# Patient Record
Sex: FEMALE | Race: WHITE | NOT HISPANIC OR LATINO | Employment: OTHER | URBAN - METROPOLITAN AREA
[De-identification: names, ages, dates, MRNs, and addresses within clinical notes are randomized per-mention and may not be internally consistent; named-entity substitution may affect disease eponyms.]

---

## 2017-02-01 ENCOUNTER — TELEPHONE (OUTPATIENT)
Dept: SMOKING CESSATION | Facility: CLINIC | Age: 82
End: 2017-02-01

## 2017-02-07 ENCOUNTER — TELEPHONE (OUTPATIENT)
Dept: SMOKING CESSATION | Facility: CLINIC | Age: 82
End: 2017-02-07

## 2017-03-06 ENCOUNTER — OFFICE VISIT (OUTPATIENT)
Dept: OPHTHALMOLOGY | Facility: CLINIC | Age: 82
End: 2017-03-06
Payer: MEDICARE

## 2017-03-06 DIAGNOSIS — Z98.42 CATARACT EXTRACTION STATUS, LEFT: ICD-10-CM

## 2017-03-06 DIAGNOSIS — E11.9 TYPE 2 DIABETES MELLITUS WITHOUT COMPLICATION, WITHOUT LONG-TERM CURRENT USE OF INSULIN: Primary | ICD-10-CM

## 2017-03-06 DIAGNOSIS — Z98.41 CATARACT EXTRACTION STATUS, RIGHT: ICD-10-CM

## 2017-03-06 PROCEDURE — 99999 PR PBB SHADOW E&M-EST. PATIENT-LVL I: CPT | Mod: PBBFAC,,, | Performed by: OPHTHALMOLOGY

## 2017-03-06 PROCEDURE — 92014 COMPRE OPH EXAM EST PT 1/>: CPT | Mod: S$PBB,,, | Performed by: OPHTHALMOLOGY

## 2017-03-06 PROCEDURE — 99211 OFF/OP EST MAY X REQ PHY/QHP: CPT | Mod: PBBFAC | Performed by: OPHTHALMOLOGY

## 2017-03-06 RX ORDER — IBUPROFEN 100 MG/5ML
1000 SUSPENSION, ORAL (FINAL DOSE FORM) ORAL DAILY
COMMUNITY

## 2017-03-06 NOTE — PROGRESS NOTES
HPI     Here for yearly check.  She has recently had problems with balance.  No   vision problems noticed by pt.    1. PCIOL OD 3/1/07  PCIOL OS 3/14/07  2. DM w/o BDR  3. YAG OS 02/12/16       Last edited by Aislinn Allred on 3/6/2017 10:20 AM.         Assessment /Plan     For exam results, see Encounter Report.      ICD-10-CM ICD-9-CM    1. Type 2 diabetes mellitus without complication, without long-term current use of insulin E11.9 250.00 Diabetes with no diabetic retinopathy on dilated exam.   Reviewed diabetic eye precautions including excellent blood sugar control, and importance of regular follow up.          2. Cataract extraction status, left Z98.42 V45.61 Well   3. Cataract extraction status, right Z98.41 V45.61 Well       RETURN TO CLINIC 1 year/ prn

## 2017-03-24 ENCOUNTER — TELEPHONE (OUTPATIENT)
Dept: SMOKING CESSATION | Facility: CLINIC | Age: 82
End: 2017-03-24

## 2017-03-24 NOTE — TELEPHONE ENCOUNTER
Pt is now able to renew benefits with [facility] to rejoin smoking cessation program. Left message with name and call back number to schedule appointment.

## 2017-03-28 ENCOUNTER — TELEPHONE (OUTPATIENT)
Dept: SMOKING CESSATION | Facility: CLINIC | Age: 82
End: 2017-03-28

## 2017-03-29 ENCOUNTER — CLINICAL SUPPORT (OUTPATIENT)
Dept: SMOKING CESSATION | Facility: CLINIC | Age: 82
End: 2017-03-29
Payer: COMMERCIAL

## 2017-03-29 DIAGNOSIS — F17.200 NICOTINE DEPENDENCE: Primary | ICD-10-CM

## 2017-03-29 PROCEDURE — 99407 BEHAV CHNG SMOKING > 10 MIN: CPT | Mod: S$GLB,,,

## 2017-05-22 ENCOUNTER — HOSPITAL ENCOUNTER (OUTPATIENT)
Dept: RADIOLOGY | Facility: HOSPITAL | Age: 82
Discharge: HOME | End: 2017-05-22
Attending: NURSE PRACTITIONER
Payer: MEDICARE

## 2017-05-22 ENCOUNTER — PROCEDURE VISIT (OUTPATIENT)
Dept: PULMONOLOGY | Facility: CLINIC | Age: 82
End: 2017-05-22
Payer: MEDICARE

## 2017-05-22 ENCOUNTER — OFFICE VISIT (OUTPATIENT)
Dept: PULMONOLOGY | Facility: CLINIC | Age: 82
End: 2017-05-22
Payer: MEDICARE

## 2017-05-22 VITALS
DIASTOLIC BLOOD PRESSURE: 70 MMHG | HEART RATE: 64 BPM | BODY MASS INDEX: 24.99 KG/M2 | HEIGHT: 65 IN | SYSTOLIC BLOOD PRESSURE: 142 MMHG | RESPIRATION RATE: 18 BRPM | OXYGEN SATURATION: 96 % | WEIGHT: 150 LBS

## 2017-05-22 DIAGNOSIS — J44.9 CHRONIC OBSTRUCTIVE PULMONARY DISEASE, UNSPECIFIED COPD TYPE: Primary | ICD-10-CM

## 2017-05-22 DIAGNOSIS — J44.9 CHRONIC OBSTRUCTIVE PULMONARY DISEASE, UNSPECIFIED COPD TYPE: ICD-10-CM

## 2017-05-22 DIAGNOSIS — Z72.0 TOBACCO ABUSE: ICD-10-CM

## 2017-05-22 LAB
POST FEF 25 75: 1.11 L/S (ref 0.88–2.15)
POST FET 100: 9.07 S
POST FEV1 FVC: 73 %
POST FEV1: 1.58 L (ref 1.77–2.37)
POST FIF 50: 2.93 L/S
POST FVC: 2.17 L (ref 2.41–3.13)
POST PEF: 3.91 L/S (ref 4.15–5.92)
PRE FEF 25 75: 1.07 L/S (ref 0.88–2.15)
PRE FET 100: 8.66 S
PRE FEV1 FVC: 74 %
PRE FEV1: 1.49 L (ref 1.77–2.37)
PRE FIF 50: 2.29 L/S
PRE FVC: 2 L (ref 2.41–3.13)
PRE PEF: 4.25 L/S (ref 4.15–5.92)
PREDICTED FEV1 FVC: 74.02 % (ref 69.12–78.92)
PREDICTED FEV1: 2.07 L (ref 1.77–2.37)
PREDICTED FVC: 2.77 L (ref 2.41–3.13)
PROVOCATION PROTOCOL: ABNORMAL

## 2017-05-22 PROCEDURE — 99999 PR PBB SHADOW E&M-EST. PATIENT-LVL IV: CPT | Mod: PBBFAC,,, | Performed by: NURSE PRACTITIONER

## 2017-05-22 PROCEDURE — 99213 OFFICE O/P EST LOW 20 MIN: CPT | Mod: 25,S$PBB,, | Performed by: NURSE PRACTITIONER

## 2017-05-22 PROCEDURE — 71020 XR CHEST PA AND LATERAL: CPT | Mod: 26,,, | Performed by: RADIOLOGY

## 2017-05-22 PROCEDURE — 99214 OFFICE O/P EST MOD 30 MIN: CPT | Mod: PBBFAC,25,PO | Performed by: NURSE PRACTITIONER

## 2017-05-22 PROCEDURE — 94060 EVALUATION OF WHEEZING: CPT | Mod: 26,S$PBB,, | Performed by: INTERNAL MEDICINE

## 2017-05-22 RX ORDER — ESCITALOPRAM OXALATE 20 MG/1
20 TABLET ORAL
COMMUNITY
Start: 2017-02-23 | End: 2017-09-20 | Stop reason: SDUPTHER

## 2017-05-22 RX ORDER — ESOMEPRAZOLE MAGNESIUM 40 MG/1
40 CAPSULE, DELAYED RELEASE ORAL
COMMUNITY
Start: 2016-12-01

## 2017-05-22 RX ORDER — MEMANTINE HYDROCHLORIDE 10 MG/1
10 TABLET ORAL
COMMUNITY
Start: 2017-02-23 | End: 2017-09-20 | Stop reason: SDUPTHER

## 2017-05-22 NOTE — PROGRESS NOTES
Subjective:       Patient ID: Marce Gill is a 81 y.o. female.    Chief Complaint: COPD        Patient was COPD and tobacco abuse presents to the office today for six-month follow-up.  Patient smokes 5 cigars a day.  Not interested in smoke cessation at this time.  Declining inhalers in the past. No fever, chills, or hemoptysis. No pleuritic type chest pain. Breathing is stable as compared to 6 months ago.        Review of Systems   Constitutional: Negative for fever, chills, appetite change and fatigue.   HENT: Negative for nosebleeds, postnasal drip, rhinorrhea and congestion.    Respiratory:        See HPI   Cardiovascular: Negative for chest pain, palpitations and leg swelling.   Musculoskeletal: Negative for back pain and joint swelling.   Skin: Negative for rash.   Gastrointestinal: Negative for abdominal pain and abdominal distention.   Neurological: Negative for dizziness and headaches.   Hematological: Negative for adenopathy.       Objective:      Physical Exam   Constitutional: She is oriented to person, place, and time. She appears well-developed and well-nourished.   HENT:   Head: Normocephalic.   Neck: Normal range of motion. Neck supple.   Cardiovascular: Normal rate and regular rhythm.    Pulmonary/Chest: Normal expansion, effort normal and breath sounds normal.   Abdominal: Soft.   Musculoskeletal: Normal range of motion.   Neurological: She is alert and oriented to person, place, and time.   Skin: Skin is warm and dry.   Psychiatric: She has a normal mood and affect.     Personal Diagnostic Review  Spirometry reviewed. FEV1 76 % of predicted.     CXR stable  Assessment:       1. Chronic obstructive pulmonary disease, unspecified COPD type    2. Tobacco abuse        Outpatient Encounter Prescriptions as of 5/22/2017   Medication Sig Dispense Refill    escitalopram oxalate (LEXAPRO) 20 MG tablet Take 20 mg by mouth.      esomeprazole (NEXIUM) 40 MG capsule Take 40 mg by mouth.      memantine  (NAMENDA) 10 MG Tab Take 10 mg by mouth.      amlodipine (NORVASC) 10 MG tablet Take by mouth. 1 Tablet Oral Every day      ascorbic acid, vitamin C, (VITAMIN C) 1000 MG tablet Take 1,000 mg by mouth once daily.      CALCIUM CARBONATE (CALCIUM 600 ORAL) Take 1 tablet by mouth 2 (two) times daily.      clonazePAM (KLONOPIN) 1 MG tablet Take 1 mg by mouth every evening.       clopidogrel (PLAVIX) 75 mg tablet Take 1 tablet by mouth once daily.      fluticasone (FLONASE) 50 mcg/actuation nasal spray 1 spray by Each Nare route as needed. 16 g 11    levothyroxine (SYNTHROID) 88 MCG tablet Take 88 mcg by mouth before breakfast.       lisinopril (PRINIVIL,ZESTRIL) 20 MG tablet TAKE 1 TABLET BY MOUTH TWICE A DAY. 180 tablet 3    meclizine (ANTIVERT) 25 mg tablet Take 0.5 tablets (12.5 mg total) by mouth 3 (three) times daily as needed. 1/2-1 Tablet Oral Every 8 hours.  spinning 12 tablet 0    meloxicam (MOBIC) 15 MG tablet Take 1 tablet by mouth once daily.      metformin (GLUCOPHAGE) 500 MG tablet Take 500 mg by mouth daily with breakfast. 1 Tablet Oral daily      montelukast (SINGULAIR) 10 mg tablet Take 1 tablet (10 mg total) by mouth every evening. 1 Tablet Oral At bedtime 30 tablet 11    pantoprazole (PROTONIX) 40 MG tablet Take 40 mg by mouth once daily.       pravastatin (PRAVACHOL) 40 MG tablet Take by mouth. 1 Tablet Oral At bedtime      TRUETRACK TEST Strp       vitamin D 1000 units Tab Take 1,000 Units by mouth once daily.      [DISCONTINUED] escitalopram oxalate (LEXAPRO) 10 MG tablet Take 10 mg by mouth.      [DISCONTINUED] loratadine (CLARITIN) 10 mg tablet Take 1 tablet (10 mg total) by mouth once daily.  0    [DISCONTINUED] PRISTIQ 50 mg Tb24 Take 50 mg by mouth once daily.        No facility-administered encounter medications on file as of 5/22/2017.      No orders of the defined types were placed in this encounter.    Plan:       Encourage smoking cessation. Declined inhaler. Follow up  in 12 months with CXR and spirometry or call earlier if any problems

## 2017-08-16 ENCOUNTER — CLINICAL SUPPORT (OUTPATIENT)
Dept: CARDIOLOGY | Facility: CLINIC | Age: 82
End: 2017-08-16
Payer: MEDICARE

## 2017-08-16 DIAGNOSIS — I65.23 BILATERAL CAROTID ARTERY STENOSIS: ICD-10-CM

## 2017-08-16 LAB — INTERNAL CAROTID STENOSIS: NORMAL

## 2017-08-16 PROCEDURE — 93880 EXTRACRANIAL BILAT STUDY: CPT | Mod: PBBFAC | Performed by: INTERNAL MEDICINE

## 2017-09-20 ENCOUNTER — OFFICE VISIT (OUTPATIENT)
Dept: CARDIOLOGY | Facility: CLINIC | Age: 82
End: 2017-09-20
Payer: MEDICARE

## 2017-09-20 VITALS
BODY MASS INDEX: 27.03 KG/M2 | DIASTOLIC BLOOD PRESSURE: 82 MMHG | HEIGHT: 65 IN | HEART RATE: 62 BPM | SYSTOLIC BLOOD PRESSURE: 130 MMHG | WEIGHT: 162.25 LBS

## 2017-09-20 DIAGNOSIS — R06.02 SHORTNESS OF BREATH: Chronic | ICD-10-CM

## 2017-09-20 DIAGNOSIS — Z86.73 HISTORY OF CVA (CEREBROVASCULAR ACCIDENT): ICD-10-CM

## 2017-09-20 DIAGNOSIS — R94.31 ABNORMAL ECG: ICD-10-CM

## 2017-09-20 DIAGNOSIS — I10 ESSENTIAL HYPERTENSION: ICD-10-CM

## 2017-09-20 DIAGNOSIS — I65.23 BILATERAL CAROTID ARTERY STENOSIS: ICD-10-CM

## 2017-09-20 DIAGNOSIS — J44.9 CHRONIC OBSTRUCTIVE PULMONARY DISEASE, UNSPECIFIED COPD TYPE: ICD-10-CM

## 2017-09-20 DIAGNOSIS — F17.200 SMOKER: ICD-10-CM

## 2017-09-20 DIAGNOSIS — Z72.0 TOBACCO ABUSE: ICD-10-CM

## 2017-09-20 DIAGNOSIS — E78.2 MIXED HYPERLIPIDEMIA: Primary | Chronic | ICD-10-CM

## 2017-09-20 DIAGNOSIS — I51.89 DIASTOLIC DYSFUNCTION: ICD-10-CM

## 2017-09-20 PROCEDURE — 3079F DIAST BP 80-89 MM HG: CPT | Mod: ,,, | Performed by: INTERNAL MEDICINE

## 2017-09-20 PROCEDURE — 1159F MED LIST DOCD IN RCRD: CPT | Mod: ,,, | Performed by: INTERNAL MEDICINE

## 2017-09-20 PROCEDURE — 1126F AMNT PAIN NOTED NONE PRSNT: CPT | Mod: ,,, | Performed by: INTERNAL MEDICINE

## 2017-09-20 PROCEDURE — 99213 OFFICE O/P EST LOW 20 MIN: CPT | Mod: PBBFAC | Performed by: INTERNAL MEDICINE

## 2017-09-20 PROCEDURE — 99999 PR PBB SHADOW E&M-EST. PATIENT-LVL III: CPT | Mod: PBBFAC,,, | Performed by: INTERNAL MEDICINE

## 2017-09-20 PROCEDURE — 93005 ELECTROCARDIOGRAM TRACING: CPT | Mod: PBBFAC | Performed by: INTERNAL MEDICINE

## 2017-09-20 PROCEDURE — 3075F SYST BP GE 130 - 139MM HG: CPT | Mod: ,,, | Performed by: INTERNAL MEDICINE

## 2017-09-20 PROCEDURE — 93010 ELECTROCARDIOGRAM REPORT: CPT | Mod: S$PBB,,, | Performed by: INTERNAL MEDICINE

## 2017-09-20 PROCEDURE — 99214 OFFICE O/P EST MOD 30 MIN: CPT | Mod: S$PBB,,, | Performed by: INTERNAL MEDICINE

## 2017-09-20 RX ORDER — METOPROLOL SUCCINATE 50 MG/1
50 TABLET, EXTENDED RELEASE ORAL
COMMUNITY
Start: 2016-08-22

## 2017-09-20 RX ORDER — IBUPROFEN 200 MG
CAPSULE ORAL
COMMUNITY

## 2017-09-20 RX ORDER — MONTELUKAST SODIUM 10 MG/1
10 TABLET ORAL
COMMUNITY
Start: 2016-08-22

## 2017-09-20 RX ORDER — PRAVASTATIN SODIUM 80 MG/1
80 TABLET ORAL
COMMUNITY
Start: 2017-05-24 | End: 2017-09-20 | Stop reason: SDUPTHER

## 2017-09-20 RX ORDER — ESCITALOPRAM OXALATE 20 MG/1
20 TABLET ORAL
COMMUNITY
Start: 2017-09-20

## 2017-09-20 RX ORDER — AMLODIPINE BESYLATE 10 MG/1
10 TABLET ORAL
COMMUNITY
Start: 2017-05-23

## 2017-09-20 RX ORDER — CLOPIDOGREL BISULFATE 75 MG/1
75 TABLET ORAL
COMMUNITY
Start: 2016-09-21

## 2017-09-20 RX ORDER — ROSUVASTATIN CALCIUM 20 MG/1
20 TABLET, COATED ORAL NIGHTLY
Qty: 90 TABLET | Refills: 3 | Status: SHIPPED | OUTPATIENT
Start: 2017-09-20 | End: 2018-09-20

## 2017-09-20 RX ORDER — CLONAZEPAM 1 MG/1
1 TABLET ORAL
COMMUNITY
Start: 2017-08-03 | End: 2018-01-30

## 2017-09-20 RX ORDER — LEVOTHYROXINE SODIUM 100 UG/1
100 TABLET ORAL
COMMUNITY
Start: 2017-05-24 | End: 2017-09-20 | Stop reason: SDUPTHER

## 2017-09-20 RX ORDER — LISINOPRIL 20 MG/1
20 TABLET ORAL
COMMUNITY
Start: 2017-05-23

## 2017-09-20 RX ORDER — ASPIRIN 81 MG/1
81 TABLET ORAL
COMMUNITY

## 2017-09-20 RX ORDER — MEMANTINE HYDROCHLORIDE 10 MG/1
10 TABLET ORAL
COMMUNITY
Start: 2017-05-23

## 2017-09-20 NOTE — PROGRESS NOTES
Subjective:    Patient ID:  Marce Gill is a 82 y.o. female who presents for evaluation of Shortness of Breath; Carotid Artery Disease; Hyperlipidemia; Hypertension; and Risk Factor Management For Atherosclerosis      HPI Mrs. Gill returns for f/u.   Her current medical conditions include dyslipidemia, HTN, DM, Diastolic Dysfunction, LVH, carotid disease, COPD, tobacco abuse.   S/p cva May 2014 (put on plavix, [place]).   Now here for annual f/u.  No recurrent tia/cva since last appt a year ago.  Still smokes on regular basis, has not cut back.  ecg today shows NSR, low precordial R wave.  There are no acute changes.  No cp sxs.  Chronic stable AYALA.  No pnd/orthopnea.    Carotid u/s Sept 2017 shows no progression from a year ago, < 50% bilateral stenosis.  HTN well cntrolled on current meds, no associated sxs.  Lipids worse, not at goal.  On pravastatin.  Off balance more as she gets older.  States she is slowing down.   Drinks 0 water daily, 2 liter of diet coke daily.        Patient Active Problem List   Diagnosis    Diabetes    Cataract extraction status    Refractive error    Smoker    Routine gynecological examination    Hot flashes, menopausal    Abnormal ECG    Hypertension    Tobacco abuse    Mixed hyperlipidemia    COPD (chronic obstructive pulmonary disease)    Shortness of breath    History of CVA (cerebrovascular accident)    Carotid stenosis    GERD (gastroesophageal reflux disease)    Diastolic dysfunction     Past Medical History:   Diagnosis Date    Acid reflux     Anxiety     Aortic stenosis 2/20/2015    CVA (cerebral infarction) 5/21/14    Diabetes mellitus type II     Hyperlipidemia     Hypertension     Thyroid disease     Tobacco abuse 2/19/2014       Current Outpatient Prescriptions:     amlodipine (NORVASC) 10 MG tablet, Take 10 mg by mouth., Disp: , Rfl:     ascorbic acid, vitamin C, (VITAMIN C) 1000 MG tablet, Take 1,000 mg by mouth once daily., Disp: , Rfl:      aspirin (ECOTRIN) 81 MG EC tablet, Take 81 mg by mouth., Disp: , Rfl:     calcium carbonate-vitamin D3 500 mg(1,250mg) -125 unit per tablet, Take by mouth., Disp: , Rfl:     clonazePAM (KLONOPIN) 1 MG tablet, Take 1 mg by mouth., Disp: , Rfl:     clopidogrel (PLAVIX) 75 mg tablet, Take 75 mg by mouth., Disp: , Rfl:     escitalopram oxalate (LEXAPRO) 20 MG tablet, Take 20 mg by mouth., Disp: , Rfl:     esomeprazole (NEXIUM) 40 MG capsule, Take 40 mg by mouth., Disp: , Rfl:     fluticasone (FLONASE) 50 mcg/actuation nasal spray, 1 spray by Each Nare route as needed., Disp: 16 g, Rfl: 11    levothyroxine (SYNTHROID) 88 MCG tablet, Take 88 mcg by mouth before breakfast. , Disp: , Rfl:     lisinopril (PRINIVIL,ZESTRIL) 20 MG tablet, Take 20 mg by mouth., Disp: , Rfl:     meclizine (ANTIVERT) 25 mg tablet, Take 0.5 tablets (12.5 mg total) by mouth 3 (three) times daily as needed. 1/2-1 Tablet Oral Every 8 hours.  spinning, Disp: 12 tablet, Rfl: 0    meloxicam (MOBIC) 15 MG tablet, Take 1 tablet by mouth once daily., Disp: , Rfl:     memantine (NAMENDA) 10 MG Tab, Take 10 mg by mouth., Disp: , Rfl:     metformin (GLUCOPHAGE) 500 MG tablet, Take 500 mg by mouth daily with breakfast. 1 Tablet Oral daily, Disp: , Rfl:     metoprolol succinate (TOPROL-XL) 50 MG 24 hr tablet, Take 50 mg by mouth., Disp: , Rfl:     montelukast (SINGULAIR) 10 mg tablet, Take 10 mg by mouth., Disp: , Rfl:     pantoprazole (PROTONIX) 40 MG tablet, Take 40 mg by mouth once daily. , Disp: , Rfl:     pravastatin (PRAVACHOL) 40 MG tablet, Take by mouth. 1 Tablet Oral At bedtime, Disp: , Rfl:     TRUETRACK TEST Strp, , Disp: , Rfl:       Review of Systems   Constitution: Negative.   HENT: Negative.    Eyes: Negative.    Cardiovascular: Positive for dyspnea on exertion.   Respiratory: Positive for shortness of breath.    Endocrine: Negative.    Hematologic/Lymphatic: Negative.    Skin: Negative.    Musculoskeletal: Positive for  "arthritis and joint pain.   Gastrointestinal: Negative.    Genitourinary: Negative.    Neurological: Positive for light-headedness and loss of balance.   Psychiatric/Behavioral: Negative.    Allergic/Immunologic: Negative.        /82   Pulse 62   Ht 5' 5" (1.651 m)   Wt 73.6 kg (162 lb 4.1 oz)   BMI 27.00 kg/m²     Wt Readings from Last 3 Encounters:   09/20/17 73.6 kg (162 lb 4.1 oz)   05/22/17 68 kg (150 lb)   11/21/16 71.7 kg (158 lb)     Temp Readings from Last 3 Encounters:   03/26/14 98 °F (36.7 °C) (Tympanic)   07/24/13 96.2 °F (35.7 °C) (Tympanic)   06/17/13 97.7 °F (36.5 °C) (Tympanic)     BP Readings from Last 3 Encounters:   09/20/17 130/82   05/22/17 (!) 142/70   11/21/16 (!) 142/86     Pulse Readings from Last 3 Encounters:   09/20/17 62   05/22/17 64   11/21/16 67          Objective:    Physical Exam   Constitutional: She is oriented to person, place, and time. Vital signs are normal. She appears well-developed and well-nourished. She is active and cooperative. She does not have a sickly appearance. She does not appear ill. No distress.   HENT:   Head: Normocephalic.   Neck: Neck supple. Normal carotid pulses, no hepatojugular reflux and no JVD present. Carotid bruit is not present. No thyromegaly present.   Cardiovascular: Normal rate, regular rhythm, S1 normal, S2 normal, normal heart sounds and normal pulses.  PMI is not displaced.  Exam reveals no gallop and no friction rub.    No murmur heard.  Pulses:       Radial pulses are 2+ on the right side, and 2+ on the left side.   Pulmonary/Chest: Effort normal and breath sounds normal. She has no wheezes. She has no rales.   Abdominal: Soft. Normal appearance, normal aorta and bowel sounds are normal. She exhibits no pulsatile liver, no abdominal bruit, no ascites and no mass. There is no splenomegaly or hepatomegaly. There is no tenderness.   Musculoskeletal: She exhibits no edema.   Lymphadenopathy:     She has no cervical adenopathy. "   Neurological: She is alert and oriented to person, place, and time.   Skin: Skin is warm. She is not diaphoretic.   Psychiatric: She has a normal mood and affect. Her behavior is normal.   Nursing note and vitals reviewed.      I have reviewed all pertinent labs and cardiac studies.      MAY 2017 FROM [facility]    CHOL 230      HDL 64        RIGHT  The right Mid Common Carotid Artery is visualized, associated with homogeneous plaque.   The right carotid bulb artery is visualized, associated with homogeneous plaque.   The right Mid Internal Carotid Artery has 20 - 39% stenosis.   The right external carotid artery is visualized.   The right vertebral artery is visualized, associated with anterograde flow.   The right ICA/CCA ratio is: 1.37    LEFT  The left Distal Common Carotid Artery is visualized.   The left carotid bulb artery is visualized, associated with homogeneous plaque.   The left Mid Internal Carotid Artery has 20 - 39% stenosis.   There is acceleration in the left external carotid artery, associated with homogeneous plaque.   The left vertebral artery is visualized, associated with anterograde flow.   The left ICA/CCA ratio is: 1.11      CONCLUSIONS   There is 20 - 39% right Internal Carotid stenosis.  There is 20 - 39% left Internal Carotid stenosis.          This document has been electronically    SIGNED BY: Mendel Saleh MD On: 08/16/2017 18:10      Assessment:       1. Mixed hyperlipidemia    2. Shortness of breath    3. Smoker    4. Abnormal ECG    5. Essential hypertension    6. Tobacco abuse    7. Diastolic dysfunction    8. Bilateral carotid artery stenosis    9. History of CVA (cerebrovascular accident)    10. Chronic obstructive pulmonary disease, unspecified COPD type         Plan:             - stable CV conditions on current medical tx.  - stable carotid artery disease with h/o CVA.  Continue asa, plavix, statin.  - pt counseled again on need to be tobacco free.  She seems  uninterested in quitting.  - lipids:  Stop pravastatin.  Start Crestor 20 mg nightly.  Pt informed of indications for change, side effects.  She has f/u appt with PCP later this fall and will schedule repeat lipids at that time.  - increase po intake of water, cut back diet coke 1/2 (drinks 2 liters daily).  - cardiac diet  - daily exercise encouraged with fall precautions.    F/u 1 year with carotid u/s.

## 2017-10-18 ENCOUNTER — CLINICAL SUPPORT (OUTPATIENT)
Dept: SMOKING CESSATION | Facility: CLINIC | Age: 82
End: 2017-10-18
Payer: COMMERCIAL

## 2017-10-18 DIAGNOSIS — F17.200 NICOTINE DEPENDENCE: Primary | ICD-10-CM

## 2017-10-18 PROCEDURE — 99407 BEHAV CHNG SMOKING > 10 MIN: CPT | Mod: S$GLB,,, | Performed by: INTERNAL MEDICINE

## 2017-11-01 ENCOUNTER — CLINICAL SUPPORT (OUTPATIENT)
Dept: SMOKING CESSATION | Facility: CLINIC | Age: 82
End: 2017-11-01
Payer: COMMERCIAL

## 2017-11-01 DIAGNOSIS — F17.200 NICOTINE DEPENDENCE: Primary | ICD-10-CM

## 2017-11-01 PROCEDURE — 99404 PREV MED CNSL INDIV APPRX 60: CPT | Mod: S$GLB,,,

## 2017-11-01 PROCEDURE — 99999 PR PBB SHADOW E&M-EST. PATIENT-LVL III: CPT | Mod: PBBFAC,,,

## 2017-11-01 RX ORDER — IBUPROFEN 200 MG
1 TABLET ORAL DAILY
Qty: 14 PATCH | Refills: 0 | Status: SHIPPED | OUTPATIENT
Start: 2017-11-01 | End: 2018-03-19

## 2017-11-07 ENCOUNTER — TELEPHONE (OUTPATIENT)
Dept: SMOKING CESSATION | Facility: CLINIC | Age: 82
End: 2017-11-07

## 2017-11-14 ENCOUNTER — TELEPHONE (OUTPATIENT)
Dept: SMOKING CESSATION | Facility: CLINIC | Age: 82
End: 2017-11-14

## 2017-11-14 NOTE — TELEPHONE ENCOUNTER
Left message about missed individual session and about medication regimen. Left my name Karely Beatty and phone number [phone].

## 2018-03-19 ENCOUNTER — OFFICE VISIT (OUTPATIENT)
Dept: OPHTHALMOLOGY | Facility: CLINIC | Age: 83
End: 2018-03-19
Payer: MEDICARE

## 2018-03-19 DIAGNOSIS — Z96.1 PSEUDOPHAKIA OF BOTH EYES: ICD-10-CM

## 2018-03-19 DIAGNOSIS — E11.9 TYPE 2 DIABETES MELLITUS WITHOUT COMPLICATION, WITHOUT LONG-TERM CURRENT USE OF INSULIN: Primary | ICD-10-CM

## 2018-03-19 DIAGNOSIS — H52.7 REFRACTION DISORDER: ICD-10-CM

## 2018-03-19 PROCEDURE — 92014 COMPRE OPH EXAM EST PT 1/>: CPT | Mod: S$PBB,,, | Performed by: OPHTHALMOLOGY

## 2018-03-19 PROCEDURE — 99999 PR PBB SHADOW E&M-EST. PATIENT-LVL II: CPT | Mod: PBBFAC,,, | Performed by: OPHTHALMOLOGY

## 2018-03-19 PROCEDURE — 99212 OFFICE O/P EST SF 10 MIN: CPT | Mod: PBBFAC | Performed by: OPHTHALMOLOGY

## 2018-03-19 NOTE — PROGRESS NOTES
HPI     Diabetic Eye Exam    Additional comments: last glaucose 105 in 10/17, patient never checks her   B.S.            Comments   Patient returns for her annual diabetic  exam patient states he vision is   good, never wears her rx can't find, patient defers manifest today due to   good vision.        1. PCIOL OD 3/1/07  PCIOL OS 3/14/07  2. DM w/o BDR  3. YAG OS 02/12/16       Last edited by Jose Manuel To MD on 3/19/2018 11:17 AM. (History)            Assessment /Plan     For exam results, see Encounter Report.      ICD-10-CM ICD-9-CM    1. Type 2 diabetes mellitus without complication, without long-term current use of insulin E11.9 250.00 Diabetes with no diabetic retinopathy on dilated exam.   Reviewed diabetic eye precautions including excellent blood sugar control, and importance of regular follow up.          2. Pseudophakia of both eyes Z96.1 V43.1 Well    3. Refraction disorder H52.7 367.9        RETURN TO CLINIC 1 year, pt reqt f/u with [facility] only and defers being ref'd out to [facility]

## 2018-05-18 ENCOUNTER — TELEPHONE (OUTPATIENT)
Dept: PULMONOLOGY | Facility: CLINIC | Age: 83
End: 2018-05-18

## 2018-05-18 NOTE — TELEPHONE ENCOUNTER
----- Message from Celia Mustafa sent at 2018  1:56 PM CDT -----  Patient have three appts on , but would like to r/s. Patient orders for her x-ray and spirometry will  on . Please adv/call [phone].[email or web address]

## 2018-05-21 ENCOUNTER — HOSPITAL ENCOUNTER (OUTPATIENT)
Dept: RADIOLOGY | Facility: HOSPITAL | Age: 83
Discharge: HOME | End: 2018-05-21
Attending: NURSE PRACTITIONER
Payer: MEDICARE

## 2018-05-21 ENCOUNTER — OFFICE VISIT (OUTPATIENT)
Dept: PULMONOLOGY | Facility: CLINIC | Age: 83
End: 2018-05-21
Payer: MEDICARE

## 2018-05-21 ENCOUNTER — PROCEDURE VISIT (OUTPATIENT)
Dept: PULMONOLOGY | Facility: CLINIC | Age: 83
End: 2018-05-21
Payer: MEDICARE

## 2018-05-21 VITALS
OXYGEN SATURATION: 98 % | HEIGHT: 65 IN | WEIGHT: 150.81 LBS | DIASTOLIC BLOOD PRESSURE: 78 MMHG | RESPIRATION RATE: 17 BRPM | HEART RATE: 75 BPM | BODY MASS INDEX: 25.13 KG/M2 | SYSTOLIC BLOOD PRESSURE: 116 MMHG

## 2018-05-21 DIAGNOSIS — J44.9 CHRONIC OBSTRUCTIVE PULMONARY DISEASE, UNSPECIFIED COPD TYPE: Primary | ICD-10-CM

## 2018-05-21 DIAGNOSIS — J44.9 CHRONIC OBSTRUCTIVE PULMONARY DISEASE, UNSPECIFIED COPD TYPE: ICD-10-CM

## 2018-05-21 DIAGNOSIS — Z72.0 TOBACCO ABUSE: ICD-10-CM

## 2018-05-21 PROCEDURE — 71046 X-RAY EXAM CHEST 2 VIEWS: CPT | Mod: TC,FY,PO

## 2018-05-21 PROCEDURE — 99214 OFFICE O/P EST MOD 30 MIN: CPT | Mod: PBBFAC,25,PO | Performed by: NURSE PRACTITIONER

## 2018-05-21 PROCEDURE — 71046 X-RAY EXAM CHEST 2 VIEWS: CPT | Mod: 26,,, | Performed by: RADIOLOGY

## 2018-05-21 PROCEDURE — 99214 OFFICE O/P EST MOD 30 MIN: CPT | Mod: 25,S$PBB,, | Performed by: NURSE PRACTITIONER

## 2018-05-21 PROCEDURE — 99999 PR PBB SHADOW E&M-EST. PATIENT-LVL IV: CPT | Mod: PBBFAC,,, | Performed by: NURSE PRACTITIONER

## 2018-05-21 PROCEDURE — 94010 BREATHING CAPACITY TEST: CPT | Mod: PBBFAC,PO

## 2018-05-21 PROCEDURE — 94010 BREATHING CAPACITY TEST: CPT | Mod: 26,S$PBB,, | Performed by: INTERNAL MEDICINE

## 2018-05-21 RX ORDER — ALBUTEROL SULFATE 90 UG/1
2 AEROSOL, METERED RESPIRATORY (INHALATION) EVERY 4 HOURS PRN
Qty: 18 G | Refills: 3 | Status: SHIPPED | OUTPATIENT
Start: 2018-05-21 | End: 2019-05-21

## 2018-05-21 NOTE — PROGRESS NOTES
"Subjective:      Patient ID: Marce Gill is a 82 y.o. female.    Chief Complaint: COPD        Patient was COPD and tobacco abuse presents to the office today for six-month follow-up.  Patient back to smoking a pack of cigarettes a day. She was down to a pack a week.  Not interested in smoking cessation at this time.  Declining inhalers in the past. No fever, chills, or hemoptysis. No pleuritic type chest pain. Breathing is stable as compared to 6 months ago.          /78   Pulse 75   Resp 17   Ht 5' 5" (1.651 m)   Wt 68.4 kg (150 lb 12.8 oz)   SpO2 98%   BMI 25.09 kg/m²   Body mass index is 25.09 kg/m².    Review of Systems   Constitutional: Negative.    HENT: Negative.    Respiratory: Negative.    Cardiovascular: Negative.    Musculoskeletal: Negative.    Gastrointestinal: Negative.    Neurological: Negative.    Psychiatric/Behavioral: Negative.      Objective:      Physical Exam   Constitutional: She is oriented to person, place, and time. She appears well-developed and well-nourished.   HENT:   Head: Normocephalic and atraumatic.   Mouth/Throat: Oropharynx is clear and moist.   Neck: Normal range of motion. Neck supple.   Cardiovascular: Normal rate and regular rhythm.  Exam reveals no gallop.    No murmur heard.  Pulmonary/Chest: Effort normal and breath sounds normal.   Abdominal: Soft. Bowel sounds are normal. She exhibits no mass. There is no tenderness.   Musculoskeletal: Normal range of motion. She exhibits no edema.   Neurological: She is alert and oriented to person, place, and time.   Skin: Skin is warm and dry.   Psychiatric: She has a normal mood and affect.     Personal Diagnostic Review  Spirometry reviewed. FEV1 71 % of predicted.      Results for orders placed during the hospital encounter of 05/21/18   X-Ray Chest PA And Lateral    Narrative EXAMINATION:  XR CHEST PA AND LATERAL    CLINICAL HISTORY:  Chronic obstructive pulmonary disease, unspecified    TECHNIQUE:  PA and lateral " views of the chest were performed.    COMPARISON:  None    FINDINGS:  The cardiac and mediastinal silhouettes appear within normal limits.   The lungs are clear bilaterally.  Posterior left rib fracture deformity again noted.  Multilevel degenerative findings noted throughout the visualized spine.      Impression No acute findings.      Electronically signed by: Garret Mast MD  Date:    05/21/2018  Time:    14:27         Assessment:       1. Chronic obstructive pulmonary disease, unspecified COPD type    2. Tobacco abuse        Outpatient Encounter Prescriptions as of 5/21/2018   Medication Sig Dispense Refill    amlodipine (NORVASC) 10 MG tablet Take 10 mg by mouth.      ascorbic acid, vitamin C, (VITAMIN C) 1000 MG tablet Take 1,000 mg by mouth once daily.      aspirin (ECOTRIN) 81 MG EC tablet Take 81 mg by mouth.      calcium carbonate-vitamin D3 500 mg(1,250mg) -125 unit per tablet Take by mouth.      clopidogrel (PLAVIX) 75 mg tablet Take 75 mg by mouth.      escitalopram oxalate (LEXAPRO) 20 MG tablet Take 20 mg by mouth.      esomeprazole (NEXIUM) 40 MG capsule Take 40 mg by mouth.      fluticasone (FLONASE) 50 mcg/actuation nasal spray 1 spray by Each Nare route as needed. 16 g 11    levothyroxine (SYNTHROID) 88 MCG tablet Take 88 mcg by mouth before breakfast.       lisinopril (PRINIVIL,ZESTRIL) 20 MG tablet Take 20 mg by mouth.      meclizine (ANTIVERT) 25 mg tablet Take 0.5 tablets (12.5 mg total) by mouth 3 (three) times daily as needed. 1/2-1 Tablet Oral Every 8 hours.  spinning 12 tablet 0    meloxicam (MOBIC) 15 MG tablet Take 1 tablet by mouth once daily.      memantine (NAMENDA) 10 MG Tab Take 10 mg by mouth.      metformin (GLUCOPHAGE) 500 MG tablet Take 500 mg by mouth daily with breakfast. 1 Tablet Oral daily      metoprolol succinate (TOPROL-XL) 50 MG 24 hr tablet Take 50 mg by mouth.      montelukast (SINGULAIR) 10 mg tablet Take 10 mg by mouth.      pantoprazole  (PROTONIX) 40 MG tablet Take 40 mg by mouth once daily.       rosuvastatin (CRESTOR) 20 MG tablet Take 1 tablet (20 mg total) by mouth every evening. 90 tablet 3    TRUETRACK TEST Strp       albuterol (VENTOLIN HFA) 90 mcg/actuation inhaler Inhale 2 puffs into the lungs every 4 (four) hours as needed for Wheezing. 18 g 3    [DISCONTINUED] amlodipine (NORVASC) 10 MG tablet Take by mouth. 1 Tablet Oral Every day      [DISCONTINUED] CALCIUM CARBONATE (CALCIUM 600 ORAL) Take 1 tablet by mouth 2 (two) times daily.      [DISCONTINUED] clonazePAM (KLONOPIN) 1 MG tablet Take 1 mg by mouth every evening.       [DISCONTINUED] clopidogrel (PLAVIX) 75 mg tablet Take 1 tablet by mouth once daily.      [DISCONTINUED] escitalopram oxalate (LEXAPRO) 20 MG tablet Take 20 mg by mouth.      [DISCONTINUED] lisinopril (PRINIVIL,ZESTRIL) 20 MG tablet TAKE 1 TABLET BY MOUTH TWICE A DAY. 180 tablet 3    [DISCONTINUED] memantine (NAMENDA) 10 MG Tab Take 10 mg by mouth.      [DISCONTINUED] montelukast (SINGULAIR) 10 mg tablet Take 1 tablet (10 mg total) by mouth every evening. 1 Tablet Oral At bedtime 30 tablet 11    [DISCONTINUED] pravastatin (PRAVACHOL) 40 MG tablet Take by mouth. 1 Tablet Oral At bedtime      [DISCONTINUED] vitamin D 1000 units Tab Take 1,000 Units by mouth once daily.       No facility-administered encounter medications on file as of 5/21/2018.      Orders Placed This Encounter   Procedures    X-Ray Chest PA And Lateral     Standing Status:   Future     Standing Expiration Date:   5/21/2019     Order Specific Question:   May the Radiologist modify the order per protocol to meet the clinical needs of the patient?     Answer:   Yes    Spirometry without Bronchodilator     Standing Status:   Future     Standing Expiration Date:   5/21/2019     Plan:      Follow up in 12 months with spirometry and CXR or call earlier if any problems

## 2018-05-22 LAB
BRPFT: NORMAL
FEF 25 75 LLN: 0.62
FEF 25 75 PRE REF: 57.7 %
FEF 25 75 PRE: 0.87 L/S (ref 0.62–2.38)
FEF 25 75 REF: 1.5
FET100 PRE: 8.34 SEC
FEV1 FVC LLN: 62
FEV1 FVC PRE REF: 92.8 %
FEV1 FVC PRE: 71.5 % (ref 62.29–91.82)
FEV1 FVC REF: 77
FEV1 LLN: 1.29
FEV1 PRE REF: 71.7 %
FEV1 PRE: 1.33 L (ref 1.29–2.41)
FEV1 REF: 1.85
FEV6 LLN: 1.69
FEV6 PRE REF: 76.5 %
FEV6 PRE: 1.8 L (ref 1.69–3.01)
FEV6 REF: 2.35
FIF50 PRE: 2.66 L/S
FVC LLN: 1.7
FVC PRE REF: 76.2 %
FVC PRE: 1.86 L (ref 1.7–3.17)
FVC REF: 2.43
ISOFEF PRE: 0.87 L/S
MVV LLN: 58
MVV REF: 69
PEF LLN: 2.98
PEF PRE REF: 89 %
PEF PRE: 4.13 L/S (ref 2.98–6.3)
PEF REF: 4.64

## 2018-05-30 ENCOUNTER — TELEPHONE (OUTPATIENT)
Dept: SMOKING CESSATION | Facility: CLINIC | Age: 83
End: 2018-05-30

## 2018-06-14 ENCOUNTER — TELEPHONE (OUTPATIENT)
Dept: SMOKING CESSATION | Facility: CLINIC | Age: 83
End: 2018-06-14

## 2018-06-16 ENCOUNTER — TELEPHONE (OUTPATIENT)
Dept: SMOKING CESSATION | Facility: CLINIC | Age: 83
End: 2018-06-16

## 2018-08-20 ENCOUNTER — HOSPITAL ENCOUNTER (EMERGENCY)
Facility: HOSPITAL | Age: 83
Discharge: HOME | End: 2018-08-20
Attending: EMERGENCY MEDICINE
Payer: MEDICARE

## 2018-08-20 VITALS
RESPIRATION RATE: 20 BRPM | TEMPERATURE: 98 F | SYSTOLIC BLOOD PRESSURE: 145 MMHG | OXYGEN SATURATION: 96 % | HEART RATE: 80 BPM | BODY MASS INDEX: 24.41 KG/M2 | HEIGHT: 65 IN | WEIGHT: 146.5 LBS | DIASTOLIC BLOOD PRESSURE: 69 MMHG

## 2018-08-20 DIAGNOSIS — S61.311A LACERATION OF LEFT INDEX FINGER W/O FOREIGN BODY WITH DAMAGE TO NAIL, INITIAL ENCOUNTER: Primary | ICD-10-CM

## 2018-08-20 PROCEDURE — 63600175 PHARM REV CODE 636 W HCPCS: Performed by: NURSE PRACTITIONER

## 2018-08-20 PROCEDURE — 99283 EMERGENCY DEPT VISIT LOW MDM: CPT | Mod: 25

## 2018-08-20 PROCEDURE — 90471 IMMUNIZATION ADMIN: CPT | Performed by: NURSE PRACTITIONER

## 2018-08-20 PROCEDURE — 29130 APPL FINGER SPLINT STATIC: CPT | Mod: F1

## 2018-08-20 PROCEDURE — 90714 TD VACC NO PRESV 7 YRS+ IM: CPT | Performed by: NURSE PRACTITIONER

## 2018-08-20 PROCEDURE — 25000003 PHARM REV CODE 250: Performed by: NURSE PRACTITIONER

## 2018-08-20 PROCEDURE — 12001 RPR S/N/AX/GEN/TRNK 2.5CM/<: CPT | Mod: 59

## 2018-08-20 RX ORDER — LIDOCAINE HYDROCHLORIDE 10 MG/ML
10 INJECTION, SOLUTION EPIDURAL; INFILTRATION; INTRACAUDAL; PERINEURAL
Status: COMPLETED | OUTPATIENT
Start: 2018-08-20 | End: 2018-08-20

## 2018-08-20 RX ORDER — TRAMADOL HYDROCHLORIDE 50 MG/1
50 TABLET ORAL EVERY 6 HOURS PRN
Qty: 12 TABLET | Refills: 0 | Status: SHIPPED | OUTPATIENT
Start: 2018-08-20 | End: 2018-08-30

## 2018-08-20 RX ORDER — CEPHALEXIN 500 MG/1
500 CAPSULE ORAL 4 TIMES DAILY
Qty: 20 CAPSULE | Refills: 0 | Status: SHIPPED | OUTPATIENT
Start: 2018-08-20 | End: 2018-08-25

## 2018-08-20 RX ADMIN — CLOSTRIDIUM TETANI TOXOID ANTIGEN (FORMALDEHYDE INACTIVATED) AND CORYNEBACTERIUM DIPHTHERIAE TOXOID ANTIGEN (FORMALDEHYDE INACTIVATED) 0.5 ML: 5; 2 INJECTION, SUSPENSION INTRAMUSCULAR at 02:08

## 2018-08-20 RX ADMIN — LIDOCAINE HYDROCHLORIDE 50 MG: 10 INJECTION, SOLUTION EPIDURAL; INFILTRATION; INTRACAUDAL; PERINEURAL at 02:08

## 2018-08-20 RX ADMIN — BACITRACIN, NEOMYCIN, POLYMYXIN B 1 EACH: 400; 3.5; 5 OINTMENT TOPICAL at 03:08

## 2018-08-20 NOTE — ED PROVIDER NOTES
"Encounter Date: 8/20/2018       History     Chief Complaint   Patient presents with    Laceration     Pt states, "My finger got caught in the blade of a blower."     82 year old female with complaint of laceration to left index finger X one hour.  Pt reports that she accidentally got her finger stuck in the blade of a blower.  Reports mild pain.  Worse with movement. NO radiation of pain.  No weakness of finger.  No other complaints.           Review of patient's allergies indicates:   Allergen Reactions    Sulfa (sulfonamide antibiotics) Rash and Hives     Past Medical History:   Diagnosis Date    Acid reflux     Anxiety     Aortic stenosis 2/20/2015    CVA (cerebral infarction) 5/21/14    Diabetes mellitus type II     Hyperlipidemia     Hypertension     Thyroid disease     Tobacco abuse 2/19/2014     Past Surgical History:   Procedure Laterality Date    CATARACT EXTRACTION      CHOLECYSTECTOMY      DILATION AND CURETTAGE OF UTERUS      PTERYGIUM REMOVAL Right APPROX 10-12 YEARS AGO    DR[facility]AR [facility]    TONSILLECTOMY      TOTAL KNEE ARTHROPLASTY      bilateral    yag  Left 02/12/16    DR. KIM     Family History   Problem Relation Age of Onset    Stroke Mother     Diabetes Father     Breast cancer Neg Hx     Colon cancer Neg Hx     Ovarian cancer Neg Hx     Thrombophilia Neg Hx      Social History     Tobacco Use    Smoking status: Current Some Day Smoker     Packs/day: 1.00     Years: 20.00     Pack years: 20.00     Types: Cigarettes    Smokeless tobacco: Never Used   Substance Use Topics    Alcohol use: No    Drug use: No     Review of Systems   Constitutional: Negative for fever.   HENT: Negative for sore throat.    Respiratory: Negative for shortness of breath.    Cardiovascular: Negative for chest pain.   Gastrointestinal: Negative for nausea.   Genitourinary: Negative for dysuria.   Musculoskeletal: Negative for back pain.   Skin: Negative for rash.        " Laceration left index finger   Neurological: Negative for weakness.   Hematological: Does not bruise/bleed easily.       Physical Exam     Initial Vitals [08/20/18 1402]   BP Pulse Resp Temp SpO2   (!) 145/69 80 20 98 °F (36.7 °C) 96 %      MAP       --         Physical Exam    Nursing note and vitals reviewed.  Constitutional: She appears well-developed and well-nourished.   HENT:   Head: Normocephalic and atraumatic.   Eyes: Conjunctivae and EOM are normal. Pupils are equal, round, and reactive to light.   Neck: Normal range of motion. Neck supple.   Cardiovascular: Normal rate, regular rhythm, normal heart sounds and intact distal pulses.   Pulmonary/Chest: Breath sounds normal.   Abdominal: Soft. There is no tenderness. There is no rebound and no guarding.   Musculoskeletal: Normal range of motion.   Full ROM left index finger without difficulty   Neurological: She is alert and oriented to person, place, and time. She has normal strength and normal reflexes.   Skin: Skin is warm and dry.   2 cm laceration with multiple surrounding abrasions distal volar aspect of left index finger, no tendon lacerations, 3 mm area of soft tissue avulsion distal radial aspect of left index finger   Psychiatric: She has a normal mood and affect. Her behavior is normal. Thought content normal.         ED Course   Lac Repair  Date/Time: 8/20/2018 2:48 PM  Performed by: Aman Curran NP  Authorized by: Duglas Mejia Jr., MD   Comments: Left index finger prepped with betadine, injected with 8 cc plain lidocaine via digital block, wound irrigated with 800 cc NS and cleansed with betadine, no tendon lacerations, no bone exposure, wound closed with #3 4-0 prolene simple interrupted sutures    Splint Application  Date/Time: 8/20/2018 2:50 PM  Performed by: Aman Curran NP  Authorized by: Duglas Mejia Jr., MD   Comments: Aluminum foam splint applied to left index finger: alignment good, neurovascular status intact        Labs  Reviewed - No data to display      Imaging Results          X-Ray Hand 3 view Left (Final result)  Result time 08/20/18 14:29:01    Final result by Shawn Calix MD (08/20/18 14:29:01)                 Impression:      No acute fracture or dislocation.      Electronically signed by: Shawn Calix MD  Date:    08/20/2018  Time:    14:29             Narrative:    EXAMINATION:  XR HAND COMPLETE 3 VIEW LEFT    CLINICAL HISTORY:  XR HAND COMPLETE 3 VIEW LEFT    COMPARISON:  None    FINDINGS:  Three views of the left hand were obtained.    No evidence of acute fracture or dislocation.  Bony mineralization is normal.  Soft tissues are unremarkable.   Moderate degenerative changes consistent with osteoarthritis.                                  Imaging Results          X-Ray Hand 3 view Left (Final result)  Result time 08/20/18 14:29:01    Final result by Shawn Calix MD (08/20/18 14:29:01)                 Impression:      No acute fracture or dislocation.      Electronically signed by: Shawn Calix MD  Date:    08/20/2018  Time:    14:29             Narrative:    EXAMINATION:  XR HAND COMPLETE 3 VIEW LEFT    CLINICAL HISTORY:  XR HAND COMPLETE 3 VIEW LEFT    COMPARISON:  None    FINDINGS:  Three views of the left hand were obtained.    No evidence of acute fracture or dislocation.  Bony mineralization is normal.  Soft tissues are unremarkable.   Moderate degenerative changes consistent with osteoarthritis.                                                      Clinical Impression:   The encounter diagnosis was Laceration of left index finger w/o foreign body with damage to nail, initial encounter.                             Aman Curran NP  08/20/18 0689

## 2018-08-21 ENCOUNTER — TELEPHONE (OUTPATIENT)
Dept: ORTHOPEDICS | Facility: CLINIC | Age: 83
End: 2018-08-21

## 2018-08-21 NOTE — TELEPHONE ENCOUNTER
Patient called a ED F/U Patient was scheduled to see Cheyanne Orozco PA-C  on 8/22/18 at 10:00 am.. Patient verbalized understanding..SJ                    ----- Message from Raza Saeed sent at 8/21/2018 10:35 AM CDT -----  Contact: pt   Pt needs an e/r follow up for finger that got caught in blower on left hand.         ..[phone] ([place])

## 2018-08-22 ENCOUNTER — OFFICE VISIT (OUTPATIENT)
Dept: ORTHOPEDICS | Facility: CLINIC | Age: 83
End: 2018-08-22
Payer: MEDICARE

## 2018-08-22 VITALS
DIASTOLIC BLOOD PRESSURE: 72 MMHG | HEIGHT: 65 IN | BODY MASS INDEX: 24.39 KG/M2 | RESPIRATION RATE: 18 BRPM | HEART RATE: 65 BPM | SYSTOLIC BLOOD PRESSURE: 140 MMHG | WEIGHT: 146.38 LBS

## 2018-08-22 DIAGNOSIS — S69.90XA INJURY OF INDEX FINGER, INITIAL ENCOUNTER: Primary | ICD-10-CM

## 2018-08-22 DIAGNOSIS — Z51.89 VISIT FOR WOUND CHECK: ICD-10-CM

## 2018-08-22 PROCEDURE — 99024 POSTOP FOLLOW-UP VISIT: CPT | Mod: POP,,, | Performed by: PHYSICIAN ASSISTANT

## 2018-08-22 PROCEDURE — 99999 PR PBB SHADOW E&M-EST. PATIENT-LVL IV: CPT | Mod: PBBFAC,,, | Performed by: PHYSICIAN ASSISTANT

## 2018-08-22 PROCEDURE — 99214 OFFICE O/P EST MOD 30 MIN: CPT | Mod: PBBFAC | Performed by: PHYSICIAN ASSISTANT

## 2018-08-22 NOTE — PROGRESS NOTES
"Subjective:     Patient ID: Marce Gill is a 82 y.o. female.    Chief Complaint: Pain, Injury, and Swelling of the Left Hand    HPI   The patient is seen today for evaluation of left index pain/injury. She is an ED follow-up from Monday (8/22).  She was blowing off her carport and the cover was off of the motor and her finger became caught in the motor. It occurred so fast and she was not sure what her hand was doing. She thinks she was trying to pull the cord on it to walk further.  She admits that the blower is "ragedy."     Whenever she was in the ER, she did have stitches placed on her finger and they prescribed her keflex. She has been taking them as prescribed each day.       She reports that years ago, she injured her left long finger after a fall. She is right hand dominant. Her current pain is a 3/10.     X-rays were taken two days ago.        Past Medical History:   Diagnosis Date    Acid reflux     Anxiety     Aortic stenosis 2/20/2015    CVA (cerebral infarction) 5/21/14    Diabetes mellitus type II     Hyperlipidemia     Hypertension     Thyroid disease     Tobacco abuse 2/19/2014     Past Surgical History:   Procedure Laterality Date    CATARACT EXTRACTION      CHOLECYSTECTOMY      DILATION AND CURETTAGE OF UTERUS      PTERYGIUM REMOVAL Right APPROX 10-12 YEARS AGO    [facility]    TONSILLECTOMY      TOTAL KNEE ARTHROPLASTY      bilateral    yag  Left 02/12/16    DR. KIM     Family History   Problem Relation Age of Onset    Stroke Mother     Diabetes Father     Breast cancer Neg Hx     Colon cancer Neg Hx     Ovarian cancer Neg Hx     Thrombophilia Neg Hx      Social History     Socioeconomic History    Marital status:      Spouse name: Not on file    Number of children: Not on file    Years of education: Not on file    Highest education level: Not on file   Social Needs    Financial resource strain: Not on file    Food insecurity - worry: " Not on file    Food insecurity - inability: Not on file    Transportation needs - medical: Not on file    Transportation needs - non-medical: Not on file   Occupational History    Not on file   Tobacco Use    Smoking status: Current Some Day Smoker     Packs/day: 1.00     Years: 20.00     Pack years: 20.00     Types: Cigarettes    Smokeless tobacco: Never Used   Substance and Sexual Activity    Alcohol use: No    Drug use: No    Sexual activity: Not Currently   Other Topics Concern    Not on file   Social History Narrative    Not on file     Medication List with Changes/Refills   Current Medications    ALBUTEROL (VENTOLIN HFA) 90 MCG/ACTUATION INHALER    Inhale 2 puffs into the lungs every 4 (four) hours as needed for Wheezing.    AMLODIPINE (NORVASC) 10 MG TABLET    Take 10 mg by mouth.    ASCORBIC ACID, VITAMIN C, (VITAMIN C) 1000 MG TABLET    Take 1,000 mg by mouth once daily.    ASPIRIN (ECOTRIN) 81 MG EC TABLET    Take 81 mg by mouth.    CALCIUM CARBONATE-VITAMIN D3 500 MG(1,250MG) -125 UNIT PER TABLET    Take by mouth.    CEPHALEXIN (KEFLEX) 500 MG CAPSULE    Take 1 capsule (500 mg total) by mouth 4 (four) times daily. for 5 days    CLOPIDOGREL (PLAVIX) 75 MG TABLET    Take 75 mg by mouth.    ESCITALOPRAM OXALATE (LEXAPRO) 20 MG TABLET    Take 20 mg by mouth.    ESOMEPRAZOLE (NEXIUM) 40 MG CAPSULE    Take 40 mg by mouth.    FLUTICASONE (FLONASE) 50 MCG/ACTUATION NASAL SPRAY    1 spray by Each Nare route as needed.    LEVOTHYROXINE (SYNTHROID) 88 MCG TABLET    Take 88 mcg by mouth before breakfast.     LISINOPRIL (PRINIVIL,ZESTRIL) 20 MG TABLET    Take 20 mg by mouth.    MECLIZINE (ANTIVERT) 25 MG TABLET    Take 0.5 tablets (12.5 mg total) by mouth 3 (three) times daily as needed. 1/2-1 Tablet Oral Every 8 hours.  spinning    MELOXICAM (MOBIC) 15 MG TABLET    Take 1 tablet by mouth once daily.    MEMANTINE (NAMENDA) 10 MG TAB    Take 10 mg by mouth.    METFORMIN (GLUCOPHAGE) 500 MG TABLET    Take 500  mg by mouth daily with breakfast. 1 Tablet Oral daily    METOPROLOL SUCCINATE (TOPROL-XL) 50 MG 24 HR TABLET    Take 50 mg by mouth.    MONTELUKAST (SINGULAIR) 10 MG TABLET    Take 10 mg by mouth.    PANTOPRAZOLE (PROTONIX) 40 MG TABLET    Take 40 mg by mouth once daily.     ROSUVASTATIN (CRESTOR) 20 MG TABLET    Take 1 tablet (20 mg total) by mouth every evening.    TRAMADOL (ULTRAM) 50 MG TABLET    Take 1 tablet (50 mg total) by mouth every 6 (six) hours as needed for Pain.    TRUETRACK TEST STRP         Review of patient's allergies indicates:   Allergen Reactions    Sulfa (sulfonamide antibiotics) Rash and Hives     Review of Systems   Constitution: Negative for chills and fever.   Gastrointestinal: Negative for abdominal pain, diarrhea, nausea and vomiting.       Objective:   Body mass index is 24.36 kg/m².  Vitals:    08/22/18 1040   BP: (!) 140/72   Pulse: 65   Resp: 18       General: Marce DASILVA is well-developed, well-nourished, appears stated age, in no acute distress, alert and oriented to time, place and person.       General    Nursing note and vitals reviewed.  Constitutional: She is oriented to person, place, and time. She appears well-developed and well-nourished.   HENT:   Head: Atraumatic.   Eyes: EOM are normal.   Neck: Neck supple.   Pulmonary/Chest: Effort normal.   Neurological: She is alert and oriented to person, place, and time.   Psychiatric: She has a normal mood and affect. Her behavior is normal.         Left Hand/Wrist Exam     Pain   Hand - The patient exhibits pain of the index IP.    Comments:  Decreased sensation only over radial side of index finger.     Incision is clean, dry and intact. No evidence for infection. Interrupted sutures in place.           Vascular Exam       Left Pulses      Radial:                    2+      Capillary Refill  Left Hand: normal capillary refill    Edema  Left Forearm: absent      EXAMINATION:  XR HAND COMPLETE 3 VIEW LEFT    CLINICAL HISTORY:  XR HAND  COMPLETE 3 VIEW LEFT    COMPARISON:  None    FINDINGS:  Three views of the left hand were obtained.    No evidence of acute fracture or dislocation.  Bony mineralization is normal.  Soft tissues are unremarkable.   Moderate degenerative changes consistent with osteoarthritis.      Impression       No acute fracture or dislocation.         Assessment:     Encounter Diagnoses   Name Primary?    Injury of index finger, initial encounter Yes    Visit for wound check         Plan:       1. Continue Abx  2. Wear new finger splint for the next 3 days  3. Keep finger clean and dry. Change dressing every 3 days.   4. Follow-up on 8/31 for suture removal

## 2018-08-31 ENCOUNTER — OFFICE VISIT (OUTPATIENT)
Dept: ORTHOPEDICS | Facility: CLINIC | Age: 83
End: 2018-08-31
Payer: MEDICARE

## 2018-08-31 VITALS
HEIGHT: 65 IN | SYSTOLIC BLOOD PRESSURE: 151 MMHG | DIASTOLIC BLOOD PRESSURE: 73 MMHG | RESPIRATION RATE: 18 BRPM | WEIGHT: 146 LBS | BODY MASS INDEX: 24.32 KG/M2 | HEART RATE: 70 BPM

## 2018-08-31 DIAGNOSIS — S69.92XD INJURY OF LEFT INDEX FINGER, SUBSEQUENT ENCOUNTER: Primary | ICD-10-CM

## 2018-08-31 DIAGNOSIS — Z51.89 VISIT FOR WOUND CHECK: ICD-10-CM

## 2018-08-31 PROCEDURE — 99214 OFFICE O/P EST MOD 30 MIN: CPT | Mod: PBBFAC | Performed by: PHYSICIAN ASSISTANT

## 2018-08-31 PROCEDURE — 99213 OFFICE O/P EST LOW 20 MIN: CPT | Mod: S$PBB,,, | Performed by: PHYSICIAN ASSISTANT

## 2018-08-31 PROCEDURE — 99999 PR PBB SHADOW E&M-EST. PATIENT-LVL IV: CPT | Mod: PBBFAC,,, | Performed by: PHYSICIAN ASSISTANT

## 2018-08-31 NOTE — PROGRESS NOTES
"Subjective:     Patient ID: Marce Gill is a 82 y.o. female.    Chief Complaint: Follow-up of the Left Hand    HPI  The patient was seen today for a 1 week follow-up regarding her left index finger. She is here today for suture removal. The patient denies any fevers or increased pain. She finished all abx.     She is an ED follow-up from Monday (8/22).  She was blowing off her carport and the cover was off of the motor and her finger became caught in the motor. It occurred so fast and she was not sure what her hand was doing. She thinks she was trying to pull the cord on it to walk further.  She admits that the blower is "ragedy."      Whenever she was in the ER, she did have stitches placed on her finger and they prescribed her keflex. She has been taking them as prescribed each day.      Past Medical History:   Diagnosis Date    Acid reflux     Anxiety     Aortic stenosis 2/20/2015    CVA (cerebral infarction) 5/21/14    Diabetes mellitus type II     Hyperlipidemia     Hypertension     Thyroid disease     Tobacco abuse 2/19/2014     Past Surgical History:   Procedure Laterality Date    CATARACT EXTRACTION      CHOLECYSTECTOMY      DILATION AND CURETTAGE OF UTERUS      PTERYGIUM REMOVAL Right APPROX 10-12 YEARS AGO    DR. [facility]ILAR AT [facility]    TONSILLECTOMY      TOTAL KNEE ARTHROPLASTY      bilateral    yag  Left 02/12/16    DR. KIM     Family History   Problem Relation Age of Onset    Stroke Mother     Diabetes Father     Breast cancer Neg Hx     Colon cancer Neg Hx     Ovarian cancer Neg Hx     Thrombophilia Neg Hx      Social History     Socioeconomic History    Marital status:      Spouse name: Not on file    Number of children: Not on file    Years of education: Not on file    Highest education level: Not on file   Social Needs    Financial resource strain: Not on file    Food insecurity - worry: Not on file    Food insecurity - inability: Not on file    " Transportation needs - medical: Not on file    Transportation needs - non-medical: Not on file   Occupational History    Not on file   Tobacco Use    Smoking status: Current Some Day Smoker     Packs/day: 1.00     Years: 20.00     Pack years: 20.00     Types: Cigarettes    Smokeless tobacco: Never Used   Substance and Sexual Activity    Alcohol use: No    Drug use: No    Sexual activity: Not Currently   Other Topics Concern    Not on file   Social History Narrative    Not on file     Medication List with Changes/Refills   Current Medications    ALBUTEROL (VENTOLIN HFA) 90 MCG/ACTUATION INHALER    Inhale 2 puffs into the lungs every 4 (four) hours as needed for Wheezing.    AMLODIPINE (NORVASC) 10 MG TABLET    Take 10 mg by mouth.    ASCORBIC ACID, VITAMIN C, (VITAMIN C) 1000 MG TABLET    Take 1,000 mg by mouth once daily.    ASPIRIN (ECOTRIN) 81 MG EC TABLET    Take 81 mg by mouth.    CALCIUM CARBONATE-VITAMIN D3 500 MG(1,250MG) -125 UNIT PER TABLET    Take by mouth.    CLOPIDOGREL (PLAVIX) 75 MG TABLET    Take 75 mg by mouth.    ESCITALOPRAM OXALATE (LEXAPRO) 20 MG TABLET    Take 20 mg by mouth.    ESOMEPRAZOLE (NEXIUM) 40 MG CAPSULE    Take 40 mg by mouth.    FLUTICASONE (FLONASE) 50 MCG/ACTUATION NASAL SPRAY    1 spray by Each Nare route as needed.    LEVOTHYROXINE (SYNTHROID) 88 MCG TABLET    Take 88 mcg by mouth before breakfast.     LISINOPRIL (PRINIVIL,ZESTRIL) 20 MG TABLET    Take 20 mg by mouth.    MECLIZINE (ANTIVERT) 25 MG TABLET    Take 0.5 tablets (12.5 mg total) by mouth 3 (three) times daily as needed. 1/2-1 Tablet Oral Every 8 hours.  spinning    MELOXICAM (MOBIC) 15 MG TABLET    Take 1 tablet by mouth once daily.    MEMANTINE (NAMENDA) 10 MG TAB    Take 10 mg by mouth.    METFORMIN (GLUCOPHAGE) 500 MG TABLET    Take 500 mg by mouth daily with breakfast. 1 Tablet Oral daily    METOPROLOL SUCCINATE (TOPROL-XL) 50 MG 24 HR TABLET    Take 50 mg by mouth.    MONTELUKAST (SINGULAIR) 10 MG TABLET     Take 10 mg by mouth.    PANTOPRAZOLE (PROTONIX) 40 MG TABLET    Take 40 mg by mouth once daily.     ROSUVASTATIN (CRESTOR) 20 MG TABLET    Take 1 tablet (20 mg total) by mouth every evening.    TRUETRACK TEST STRP         Review of patient's allergies indicates:   Allergen Reactions    Sulfa (sulfonamide antibiotics) Rash and Hives     Review of Systems   Constitution: Negative for chills and fever.   Gastrointestinal: Negative for abdominal pain, diarrhea, nausea and vomiting.       Objective:   Body mass index is 24.3 kg/m².  Vitals:    08/31/18 0907   BP: (!) 151/73   Pulse: 70   Resp: 18       General: Marce DASILVA is well-developed, well-nourished, appears stated age, in no acute distress, alert and oriented to time, place and person.       General    Nursing note and vitals reviewed.  Constitutional: She is oriented to person, place, and time. She appears well-developed and well-nourished.   HENT:   Head: Atraumatic.   Eyes: EOM are normal.   Neck: Neck supple.   Pulmonary/Chest: Effort normal.   Neurological: She is alert and oriented to person, place, and time.   Psychiatric: She has a normal mood and affect. Her behavior is normal.         Left Hand/Wrist Exam     Pain   Hand - The patient exhibits pain of the index IP.    Comments:  Patient has sensation intact over entire index finger.     No erythema or swelling. Mild TTP over DIP joint. Nail is intact.             Assessment:     Encounter Diagnoses   Name Primary?    Injury of left index finger, subsequent encounter Yes    Visit for wound check         Plan:     Patient had sutures removed without incident today. She was informed to not get her finger wet for 24 hrs and to avoid submerging it in bath water for a week.    She had a finger stax splint placed on her finger and will use this to protect her fingertip. She will F/U in 3 weeks to recheck her finger.

## 2018-09-11 ENCOUNTER — CLINICAL SUPPORT (OUTPATIENT)
Dept: CARDIOLOGY | Facility: CLINIC | Age: 83
End: 2018-09-11
Attending: INTERNAL MEDICINE
Payer: MEDICARE

## 2018-09-11 DIAGNOSIS — Z86.73 HISTORY OF CVA (CEREBROVASCULAR ACCIDENT): ICD-10-CM

## 2018-09-11 DIAGNOSIS — I65.23 BILATERAL CAROTID ARTERY STENOSIS: ICD-10-CM

## 2018-09-11 LAB — INTERNAL CAROTID STENOSIS: NORMAL

## 2018-09-11 PROCEDURE — 93880 EXTRACRANIAL BILAT STUDY: CPT | Mod: PBBFAC,PO | Performed by: INTERNAL MEDICINE

## 2018-09-26 ENCOUNTER — CLINICAL SUPPORT (OUTPATIENT)
Dept: CARDIOLOGY | Facility: CLINIC | Age: 83
End: 2018-09-26
Payer: MEDICARE

## 2018-09-26 ENCOUNTER — OFFICE VISIT (OUTPATIENT)
Dept: CARDIOLOGY | Facility: CLINIC | Age: 83
End: 2018-09-26
Payer: MEDICARE

## 2018-09-26 ENCOUNTER — OFFICE VISIT (OUTPATIENT)
Dept: ORTHOPEDICS | Facility: CLINIC | Age: 83
End: 2018-09-26
Payer: MEDICARE

## 2018-09-26 VITALS
HEART RATE: 75 BPM | WEIGHT: 150.13 LBS | DIASTOLIC BLOOD PRESSURE: 68 MMHG | BODY MASS INDEX: 25.01 KG/M2 | SYSTOLIC BLOOD PRESSURE: 138 MMHG | HEIGHT: 65 IN

## 2018-09-26 VITALS
BODY MASS INDEX: 24.32 KG/M2 | HEIGHT: 65 IN | HEART RATE: 79 BPM | RESPIRATION RATE: 18 BRPM | DIASTOLIC BLOOD PRESSURE: 73 MMHG | WEIGHT: 146 LBS | SYSTOLIC BLOOD PRESSURE: 159 MMHG

## 2018-09-26 DIAGNOSIS — J44.9 CHRONIC OBSTRUCTIVE PULMONARY DISEASE, UNSPECIFIED COPD TYPE: ICD-10-CM

## 2018-09-26 DIAGNOSIS — S69.92XD INJURY OF LEFT INDEX FINGER, SUBSEQUENT ENCOUNTER: Primary | ICD-10-CM

## 2018-09-26 DIAGNOSIS — E10.9 TYPE 1 DIABETES MELLITUS WITHOUT COMPLICATION: ICD-10-CM

## 2018-09-26 DIAGNOSIS — I10 ESSENTIAL HYPERTENSION: ICD-10-CM

## 2018-09-26 DIAGNOSIS — R94.31 ABNORMAL ECG: ICD-10-CM

## 2018-09-26 DIAGNOSIS — I51.89 DIASTOLIC DYSFUNCTION: ICD-10-CM

## 2018-09-26 DIAGNOSIS — I73.9 PAD (PERIPHERAL ARTERY DISEASE): ICD-10-CM

## 2018-09-26 DIAGNOSIS — Z86.73 HISTORY OF CVA (CEREBROVASCULAR ACCIDENT): ICD-10-CM

## 2018-09-26 DIAGNOSIS — I65.23 ASYMPTOMATIC STENOSIS OF BOTH CAROTID ARTERIES WITHOUT INFARCTION: ICD-10-CM

## 2018-09-26 DIAGNOSIS — F17.200 SMOKER: ICD-10-CM

## 2018-09-26 DIAGNOSIS — I10 ESSENTIAL HYPERTENSION, MALIGNANT: ICD-10-CM

## 2018-09-26 DIAGNOSIS — E78.2 MIXED HYPERLIPIDEMIA: Primary | Chronic | ICD-10-CM

## 2018-09-26 DIAGNOSIS — R06.02 SHORTNESS OF BREATH: Chronic | ICD-10-CM

## 2018-09-26 DIAGNOSIS — Z72.0 TOBACCO ABUSE: ICD-10-CM

## 2018-09-26 PROCEDURE — 93010 ELECTROCARDIOGRAM REPORT: CPT | Mod: S$PBB,,, | Performed by: INTERNAL MEDICINE

## 2018-09-26 PROCEDURE — 99214 OFFICE O/P EST MOD 30 MIN: CPT | Mod: PBBFAC,25 | Performed by: PHYSICIAN ASSISTANT

## 2018-09-26 PROCEDURE — 99213 OFFICE O/P EST LOW 20 MIN: CPT | Mod: PBBFAC,27,25 | Performed by: INTERNAL MEDICINE

## 2018-09-26 PROCEDURE — 99214 OFFICE O/P EST MOD 30 MIN: CPT | Mod: S$PBB,,, | Performed by: INTERNAL MEDICINE

## 2018-09-26 PROCEDURE — 99999 PR PBB SHADOW E&M-EST. PATIENT-LVL IV: CPT | Mod: PBBFAC,,, | Performed by: PHYSICIAN ASSISTANT

## 2018-09-26 PROCEDURE — 93005 ELECTROCARDIOGRAM TRACING: CPT | Mod: PBBFAC | Performed by: INTERNAL MEDICINE

## 2018-09-26 PROCEDURE — 99999 PR PBB SHADOW E&M-EST. PATIENT-LVL III: CPT | Mod: PBBFAC,,, | Performed by: INTERNAL MEDICINE

## 2018-09-26 PROCEDURE — 99213 OFFICE O/P EST LOW 20 MIN: CPT | Mod: S$PBB,,, | Performed by: PHYSICIAN ASSISTANT

## 2018-09-26 NOTE — PROGRESS NOTES
"Subjective:     Patient ID: Marce Gill is a 83 y.o. female.    Chief Complaint: Follow-up of the Left Hand (Left hand/ index finger injury )    HPI    The patient was seen today for a  follow-up regarding her left index finger. During her last office visit she did have suture removal. The patient denies any fevers or increased pain. She finished all abx. She has no concerns.      She is an ED follow-up from Monday (8/22).  She was blowing off her carport and the cover was off of the motor and her finger became caught in the motor. It occurred so fast and she was not sure what her hand was doing. She thinks she was trying to pull the cord on it to walk further.  She admits that the blower is "ragedy."      Whenever she was in the ER, she did have stitches placed on her finger and they prescribed her keflex.   Past Medical History:   Diagnosis Date    Acid reflux     Anxiety     Aortic stenosis 2/20/2015    CVA (cerebral infarction) 5/21/14    Diabetes mellitus type II     Hyperlipidemia     Hypertension     Thyroid disease     Tobacco abuse 2/19/2014     Past Surgical History:   Procedure Laterality Date    CATARACT EXTRACTION      CHOLECYSTECTOMY      DILATION AND CURETTAGE OF UTERUS      PTERYGIUM REMOVAL Right APPROX 10-12 YEARS AGO    DR[facility]    TONSILLECTOMY      TOTAL KNEE ARTHROPLASTY      bilateral    yag  Left 02/12/16    DR. KIM     Family History   Problem Relation Age of Onset    Stroke Mother     Diabetes Father     Breast cancer Neg Hx     Colon cancer Neg Hx     Ovarian cancer Neg Hx     Thrombophilia Neg Hx      Social History     Socioeconomic History    Marital status:      Spouse name: Not on file    Number of children: Not on file    Years of education: Not on file    Highest education level: Not on file   Social Needs    Financial resource strain: Not on file    Food insecurity - worry: Not on file    Food insecurity - inability: " Not on file    Transportation needs - medical: Not on file    Transportation needs - non-medical: Not on file   Occupational History    Not on file   Tobacco Use    Smoking status: Current Some Day Smoker     Packs/day: 1.00     Years: 20.00     Pack years: 20.00     Types: Cigarettes    Smokeless tobacco: Never Used   Substance and Sexual Activity    Alcohol use: No    Drug use: No    Sexual activity: Not Currently   Other Topics Concern    Not on file   Social History Narrative    Not on file        Medication List           Accurate as of 9/26/18 11:33 AM. If you have any questions, ask your nurse or doctor.               CONTINUE taking these medications    albuterol 90 mcg/actuation inhaler  Commonly known as:  VENTOLIN HFA  Inhale 2 puffs into the lungs every 4 (four) hours as needed for Wheezing.     amLODIPine 10 MG tablet  Commonly known as:  NORVASC     aspirin 81 MG EC tablet  Commonly known as:  ECOTRIN     calcium carbonate-vitamin D3 500 mg(1,250mg) -125 unit per tablet     clopidogrel 75 mg tablet  Commonly known as:  PLAVIX     escitalopram oxalate 20 MG tablet  Commonly known as:  LEXAPRO     fluticasone 50 mcg/actuation nasal spray  Commonly known as:  FLONASE  1 spray by Each Nare route as needed.     levothyroxine 88 MCG tablet  Commonly known as:  SYNTHROID     lisinopril 20 MG tablet  Commonly known as:  PRINIVIL,ZESTRIL     meclizine 25 mg tablet  Commonly known as:  ANTIVERT  Take 0.5 tablets (12.5 mg total) by mouth 3 (three) times daily as needed. 1/2-1 Tablet Oral Every 8 hours.  spinning     meloxicam 15 MG tablet  Commonly known as:  MOBIC     memantine 10 MG Tab  Commonly known as:  NAMENDA     metFORMIN 500 MG tablet  Commonly known as:  GLUCOPHAGE     metoprolol succinate 50 MG 24 hr tablet  Commonly known as:  TOPROL-XL     montelukast 10 mg tablet  Commonly known as:  SINGULAIR     NexIUM 40 MG capsule  Generic drug:  esomeprazole     pantoprazole 40 MG tablet  Commonly  known as:  PROTONIX     rosuvastatin 20 MG tablet  Commonly known as:  CRESTOR  Take 1 tablet (20 mg total) by mouth every evening.     TRUETRACK TEST Strp  Generic drug:  blood sugar diagnostic     VITAMIN C 1000 MG tablet  Generic drug:  ascorbic acid (vitamin C)          Review of patient's allergies indicates:   Allergen Reactions    Sulfa (sulfonamide antibiotics) Rash and Hives     Review of Systems   Constitution: Negative for chills and fever.   Musculoskeletal: Positive for joint pain and joint swelling.   Gastrointestinal: Negative for abdominal pain, diarrhea, nausea and vomiting.       Objective:   Body mass index is 24.3 kg/m².  Vitals:    09/26/18 1113   BP: (!) 159/73   Pulse: 79   Resp: 18       General: Marce DASILVA is well-developed, well-nourished, appears stated age, in no acute distress, alert and oriented to time, place and person.       General    Nursing note and vitals reviewed.  Constitutional: She is oriented to person, place, and time. She appears well-developed and well-nourished.   HENT:   Head: Atraumatic.   Eyes: EOM are normal.   Neck: Neck supple.   Pulmonary/Chest: Effort normal.   Neurological: She is alert and oriented to person, place, and time.   Psychiatric: She has a normal mood and affect. Her behavior is normal.         Left Hand/Wrist Exam     Range of Motion     Finger Flexion   MP Index: 70     Comments:  Mild tenderness over PIP and DIP joint of left index finger.     Nailbed is intact. No evidence for infection (no purulence, induration or erythema).               Muscle Strength   Left Upper Extremity  Index Finger: 4/5    Vascular Exam       Left Pulses      Radial:                    2+      Capillary Refill  Left Hand: normal capillary refill    Edema  Left Forearm: absent      Assessment:     Encounter Diagnosis   Name Primary?    Injury of left index finger, subsequent encounter Yes        Plan:     Patient was educated on at-home exercises and to massage finger  with cocoa butter or oil.     If she has any increased pain, swelling or stiffness she will follow-up within 4 weeks. At this time, she does not want a referral to OT.

## 2018-09-26 NOTE — PROGRESS NOTES
Subjective:    Patient ID:  Marce Gill is a 83 y.o. female who presents for evaluation of Annual Exam; Carotid Artery Disease; Hyperlipidemia; Hypertension; and Risk Factor Management For Atherosclerosis        HPI pt presents for annual f/u.   Her current medical conditions include dyslipidemia, HTN, DM, Diastolic Dysfunction, LVH, carotid disease, COPD, tobacco abuse.   S/p cva May 2014 (put on plavix, [place]).   Here for f/u.   She denies chest pain sxs.  Stable chronic AYALA, unchanged, no pnd/orthopnea.  No recurrent TIA/CVA sxs.  On asa, plavix.  ecg today shows NSR, LVH, no acute changes.  Still smokes 1 ppd, doesn't want to quit.  BP stable but above goal.  Runs 150's systolic.  Lipids much improved, LDL 53 on outside labs 8/18.  DM well controlled with HGAIC < 6.0% 8/18.  Does not drink water, only diet coke.  Did not follow advice from last year to increase water intake.  No exercise.  Carotid u/s 9/18 < 50% stenosis, no change. \  Denies leg pains.    Current Outpatient Medications:     albuterol (VENTOLIN HFA) 90 mcg/actuation inhaler, Inhale 2 puffs into the lungs every 4 (four) hours as needed for Wheezing., Disp: 18 g, Rfl: 3    amlodipine (NORVASC) 10 MG tablet, Take 10 mg by mouth., Disp: , Rfl:     ascorbic acid, vitamin C, (VITAMIN C) 1000 MG tablet, Take 1,000 mg by mouth once daily., Disp: , Rfl:     aspirin (ECOTRIN) 81 MG EC tablet, Take 81 mg by mouth., Disp: , Rfl:     calcium carbonate-vitamin D3 500 mg(1,250mg) -125 unit per tablet, Take by mouth., Disp: , Rfl:     clopidogrel (PLAVIX) 75 mg tablet, Take 75 mg by mouth., Disp: , Rfl:     escitalopram oxalate (LEXAPRO) 20 MG tablet, Take 20 mg by mouth., Disp: , Rfl:     esomeprazole (NEXIUM) 40 MG capsule, Take 40 mg by mouth., Disp: , Rfl:     fluticasone (FLONASE) 50 mcg/actuation nasal spray, 1 spray by Each Nare route as needed., Disp: 16 g, Rfl: 11    levothyroxine (SYNTHROID) 88 MCG tablet, Take 88 mcg by mouth before  "breakfast. , Disp: , Rfl:     lisinopril (PRINIVIL,ZESTRIL) 20 MG tablet, Take 20 mg by mouth., Disp: , Rfl:     meclizine (ANTIVERT) 25 mg tablet, Take 0.5 tablets (12.5 mg total) by mouth 3 (three) times daily as needed. 1/2-1 Tablet Oral Every 8 hours.  spinning, Disp: 12 tablet, Rfl: 0    meloxicam (MOBIC) 15 MG tablet, Take 1 tablet by mouth once daily., Disp: , Rfl:     memantine (NAMENDA) 10 MG Tab, Take 10 mg by mouth., Disp: , Rfl:     metformin (GLUCOPHAGE) 500 MG tablet, Take 500 mg by mouth daily with breakfast. 1 Tablet Oral daily, Disp: , Rfl:     metoprolol succinate (TOPROL-XL) 50 MG 24 hr tablet, Take 50 mg by mouth., Disp: , Rfl:     montelukast (SINGULAIR) 10 mg tablet, Take 10 mg by mouth., Disp: , Rfl:     pantoprazole (PROTONIX) 40 MG tablet, Take 40 mg by mouth once daily. , Disp: , Rfl:     rosuvastatin (CRESTOR) 20 MG tablet, Take 1 tablet (20 mg total) by mouth every evening., Disp: 90 tablet, Rfl: 3    TRUETRACK TEST Strp, , Disp: , Rfl:       Review of Systems   Constitution: Negative.   HENT: Negative.    Eyes: Negative.    Cardiovascular: Positive for dyspnea on exertion.   Respiratory: Positive for shortness of breath.    Endocrine: Negative.    Hematologic/Lymphatic: Negative.    Skin: Negative.    Musculoskeletal: Positive for arthritis.   Gastrointestinal: Negative.    Genitourinary: Negative.    Neurological: Negative.    Psychiatric/Behavioral: Negative.    Allergic/Immunologic: Negative.        /68 (BP Location: Right arm, Patient Position: Sitting, BP Method: Medium (Manual))   Pulse 75   Ht 5' 5" (1.651 m)   Wt 68.1 kg (150 lb 2.1 oz)   BMI 24.98 kg/m²       Wt Readings from Last 3 Encounters:   09/26/18 68.1 kg (150 lb 2.1 oz)   09/26/18 66.2 kg (146 lb)   08/31/18 66.2 kg (146 lb)     Temp Readings from Last 3 Encounters:   08/20/18 98 °F (36.7 °C) (Oral)   03/26/14 98 °F (36.7 °C) (Tympanic)   07/24/13 96.2 °F (35.7 °C) (Tympanic)     BP Readings from " Last 3 Encounters:   09/26/18 138/68   09/26/18 (!) 159/73   08/31/18 (!) 151/73     Pulse Readings from Last 3 Encounters:   09/26/18 75   09/26/18 79   08/31/18 70          Objective:    Physical Exam   Constitutional: She is oriented to person, place, and time. Vital signs are normal. She appears well-developed and well-nourished. She is active and cooperative. She does not have a sickly appearance. She does not appear ill. No distress.   HENT:   Head: Normocephalic.   Neck: Neck supple. Normal carotid pulses, no hepatojugular reflux and no JVD present. Carotid bruit is not present. No thyromegaly present.   Cardiovascular: Normal rate, regular rhythm, S1 normal, S2 normal and normal heart sounds. PMI is not displaced. Exam reveals no gallop and no friction rub.   No murmur heard.  Pulses:       Radial pulses are 2+ on the right side, and 2+ on the left side.        Dorsalis pedis pulses are 2+ on the right side, and 2+ on the left side.        Posterior tibial pulses are 0 on the right side, and 2+ on the left side.   Pulmonary/Chest: Effort normal and breath sounds normal. She has no wheezes. She has no rales.   Abdominal: Soft. Normal appearance, normal aorta and bowel sounds are normal. She exhibits no pulsatile liver, no abdominal bruit, no ascites and no mass. There is no splenomegaly or hepatomegaly. There is no tenderness.   Musculoskeletal: She exhibits no edema.   Lymphadenopathy:     She has no cervical adenopathy.   Neurological: She is alert and oriented to person, place, and time.   Skin: Skin is warm. She is not diaphoretic.   Psychiatric: She has a normal mood and affect. Her behavior is normal.   Nursing note and vitals reviewed.      I have reviewed all pertinent labs and cardiac studies.      RIGHT  The right Distal Common Carotid Artery is visualized, associated with homogeneous plaque.   The right carotid bulb artery is visualized, associated with homogeneous plaque.   The right Mid Internal  Carotid Artery has 20 - 39% stenosis.   The right external carotid artery is visualized.   The right vertebral artery is visualized, associated with anterograde flow.   The right ICA/CCA ratio is: 1.61    LEFT  The left Mid Common Carotid Artery is visualized.   The left carotid bulb artery is visualized, associated with homogeneous plaque.   The left Mid Internal Carotid Artery has 20 - 39% stenosis.   The left external carotid artery is visualized.   The left vertebral artery is visualized, associated with anterograde flow.   The left ICA/CCA ratio is: 1.30      CONCLUSIONS   There is 20 - 39% right Internal Carotid stenosis.  There is 20 - 39% left Internal Carotid stenosis.          This document has been electronically    SIGNED BY: Yeyo Khan MD On: 09/11/2018 19:48      Assessment:       1. Mixed hyperlipidemia    2. Essential hypertension    3. Diastolic dysfunction    4. History of CVA (cerebrovascular accident)    5. Chronic obstructive pulmonary disease, unspecified COPD type    6. Tobacco abuse    7. Abnormal ECG    8. Smoker    9. Type 1 diabetes mellitus without complication    10. Shortness of breath    11. PAD (peripheral artery disease)    12. Asymptomatic stenosis of both carotid arteries without infarction         Plan:             Chronic stable CV conditions on current tx.  Needs to quit smoking but little interest.  Increase water intake, cut back diet coke.  Continue current meds  Reviewed tests with pt  Cardiac diet  Exercise advised  B LE vas studies -- phone review.  Continue asa, plavix for CVA protection.    Fu 1 year with echo/stress test + carotid u/s.

## 2018-09-27 DIAGNOSIS — I10 ESSENTIAL HYPERTENSION, MALIGNANT: Primary | ICD-10-CM

## 2018-10-01 ENCOUNTER — TELEPHONE (OUTPATIENT)
Dept: CARDIOLOGY | Facility: CLINIC | Age: 83
End: 2018-10-01

## 2018-10-01 RX ORDER — VENLAFAXINE HYDROCHLORIDE 150 MG/1
CAPSULE, EXTENDED RELEASE ORAL
COMMUNITY
Start: 2018-08-09

## 2018-10-01 RX ORDER — TEMAZEPAM 30 MG/1
CAPSULE ORAL
COMMUNITY

## 2018-10-01 NOTE — TELEPHONE ENCOUNTER
----- Message from Tammy Jacome sent at 10/1/2018  1:45 PM CDT -----  Contact: pt  Pt states she would like to speak to the nurse regarding her medication.  Pt states she needed to give the Dr a list of meds to have on file.  Please see the list of meds below:    Restoril 7.5mg 1 at bedtime  Venlafaxine HCI 225mg 1 tablet daily  Norvasc 10mg 1 daily  Synthroid 112mcg 1 daily  Lisinopril 20 mg 1 2X a day  Crestor 20mg 1 daily  Metoprolol Tartrare 25mg 2x a day  Metformin 500mg 2x a day  Plavix 75mg 1 a day  Asprin 81mg 1 daily  B complex Calcium 600mg 1 day  Montelukast sodium 40mg 1 day  Esomeprazole sodium 40mg 1 day    Pt can be reached @ [phone].

## 2018-10-03 ENCOUNTER — TELEPHONE (OUTPATIENT)
Dept: CARDIOLOGY | Facility: CLINIC | Age: 83
End: 2018-10-03

## 2018-10-09 ENCOUNTER — CLINICAL SUPPORT (OUTPATIENT)
Dept: CARDIOLOGY | Facility: CLINIC | Age: 83
End: 2018-10-09
Attending: INTERNAL MEDICINE
Payer: MEDICARE

## 2018-10-09 DIAGNOSIS — J44.9 CHRONIC OBSTRUCTIVE PULMONARY DISEASE, UNSPECIFIED COPD TYPE: ICD-10-CM

## 2018-10-09 DIAGNOSIS — Z86.73 HISTORY OF CVA (CEREBROVASCULAR ACCIDENT): ICD-10-CM

## 2018-10-09 DIAGNOSIS — I51.89 DIASTOLIC DYSFUNCTION: ICD-10-CM

## 2018-10-09 DIAGNOSIS — I10 ESSENTIAL HYPERTENSION: ICD-10-CM

## 2018-10-09 DIAGNOSIS — E78.2 MIXED HYPERLIPIDEMIA: Chronic | ICD-10-CM

## 2018-10-09 DIAGNOSIS — E10.9 TYPE 1 DIABETES MELLITUS WITHOUT COMPLICATION: ICD-10-CM

## 2018-10-09 DIAGNOSIS — R94.31 ABNORMAL ECG: ICD-10-CM

## 2018-10-09 DIAGNOSIS — I65.23 ASYMPTOMATIC STENOSIS OF BOTH CAROTID ARTERIES WITHOUT INFARCTION: ICD-10-CM

## 2018-10-09 DIAGNOSIS — F17.200 SMOKER: ICD-10-CM

## 2018-10-09 DIAGNOSIS — Z72.0 TOBACCO ABUSE: ICD-10-CM

## 2018-10-09 DIAGNOSIS — I73.9 PAD (PERIPHERAL ARTERY DISEASE): ICD-10-CM

## 2018-10-09 DIAGNOSIS — R06.02 SHORTNESS OF BREATH: Chronic | ICD-10-CM

## 2018-10-09 LAB — VASCULAR ANKLE BRACHIAL INDEX (ABI) RIGHT: 0.84 (ref 0.9–1.2)

## 2018-10-09 PROCEDURE — 93925 LOWER EXTREMITY STUDY: CPT | Mod: PBBFAC,PO | Performed by: INTERNAL MEDICINE

## 2018-10-09 PROCEDURE — 93922 UPR/L XTREMITY ART 2 LEVELS: CPT | Mod: PBBFAC,PO | Performed by: INTERNAL MEDICINE

## 2018-10-10 ENCOUNTER — TELEPHONE (OUTPATIENT)
Dept: CARDIOLOGY | Facility: CLINIC | Age: 83
End: 2018-10-10

## 2018-10-11 NOTE — TELEPHONE ENCOUNTER
Please call pt  Test results are good.  No significant blockages noted in leg arteries.  F/u next year    Dr Hardy

## 2018-10-29 ENCOUNTER — TELEPHONE (OUTPATIENT)
Dept: SMOKING CESSATION | Facility: CLINIC | Age: 83
End: 2018-10-29

## 2018-12-12 ENCOUNTER — CLINICAL SUPPORT (OUTPATIENT)
Dept: SMOKING CESSATION | Facility: CLINIC | Age: 83
End: 2018-12-12
Payer: COMMERCIAL

## 2018-12-12 DIAGNOSIS — F17.200 NICOTINE DEPENDENCE: Primary | ICD-10-CM

## 2018-12-12 PROCEDURE — 99407 BEHAV CHNG SMOKING > 10 MIN: CPT | Mod: S$GLB,,,

## 2018-12-12 NOTE — PROGRESS NOTES
Spoke with patient today in regard to smoking cessation 12 month phone follow up, states not tobacco free. Patient is not interested in returning to the smoking cessation program at this time. Will call when ready to schedule. Informed patient of benefit period, and contact information. Will complete smart form for 12 month phone follow up on Quit attempt #3.

## 2019-02-11 ENCOUNTER — TELEPHONE (OUTPATIENT)
Dept: PULMONOLOGY | Facility: CLINIC | Age: 84
End: 2019-02-11

## 2019-02-11 NOTE — TELEPHONE ENCOUNTER
Appointment rescheduled 04/22/19, patient notified and agreed with date and time, reminder letter mailed

## 2019-05-02 ENCOUNTER — HOSPITAL ENCOUNTER (OUTPATIENT)
Dept: RADIOLOGY | Facility: HOSPITAL | Age: 84
Discharge: HOME | End: 2019-05-02
Attending: NURSE PRACTITIONER
Payer: MEDICARE

## 2019-05-02 ENCOUNTER — OFFICE VISIT (OUTPATIENT)
Dept: PULMONOLOGY | Facility: CLINIC | Age: 84
End: 2019-05-02
Payer: MEDICARE

## 2019-05-02 ENCOUNTER — CLINICAL SUPPORT (OUTPATIENT)
Dept: PULMONOLOGY | Facility: CLINIC | Age: 84
End: 2019-05-02
Payer: MEDICARE

## 2019-05-02 VITALS
HEIGHT: 65 IN | WEIGHT: 145.31 LBS | BODY MASS INDEX: 24.21 KG/M2 | OXYGEN SATURATION: 97 % | DIASTOLIC BLOOD PRESSURE: 74 MMHG | SYSTOLIC BLOOD PRESSURE: 112 MMHG | HEART RATE: 70 BPM | RESPIRATION RATE: 16 BRPM

## 2019-05-02 DIAGNOSIS — J44.9 CHRONIC OBSTRUCTIVE PULMONARY DISEASE, UNSPECIFIED COPD TYPE: ICD-10-CM

## 2019-05-02 DIAGNOSIS — Z72.0 TOBACCO ABUSE: ICD-10-CM

## 2019-05-02 DIAGNOSIS — F17.200 SMOKER: ICD-10-CM

## 2019-05-02 LAB
BRPFT: ABNORMAL
FEF 25 75 LLN: 0.63
FEF 25 75 PRE REF: 54.8 %
FEF 25 75 REF: 1.54
FEV1 FVC LLN: 62
FEV1 FVC PRE REF: 92.2 %
FEV1 FVC REF: 77
FEV1 LLN: 1.36
FEV1 PRE REF: 68 %
FEV1 REF: 1.95
FEV6 LLN: 1.84
FEV6 PRE REF: 70.8 %
FEV6 PRE: 1.8 L (ref 1.84–3.24)
FEV6 REF: 2.54
FVC LLN: 1.8
FVC PRE REF: 72.6 %
FVC REF: 2.58
PEF LLN: 3.08
PEF PRE REF: 79.1 %
PEF REF: 4.84
PRE FEF 25 75: 0.84 L/S (ref 0.63–2.45)
PRE FET 100: 9.27 SEC
PRE FEV1 FVC: 70.74 % (ref 61.86–91.55)
PRE FEV1: 1.33 L (ref 1.36–2.55)
PRE FVC: 1.88 L (ref 1.8–3.37)
PRE PEF: 3.83 L/S (ref 3.08–6.61)

## 2019-05-02 PROCEDURE — 94375 PR RESPIRATORY FLOW VOLUME LOOP: ICD-10-PCS | Mod: 26,S$PBB,, | Performed by: INTERNAL MEDICINE

## 2019-05-02 PROCEDURE — 71046 XR CHEST PA AND LATERAL: ICD-10-PCS | Mod: 26,,, | Performed by: RADIOLOGY

## 2019-05-02 PROCEDURE — 94375 RESPIRATORY FLOW VOLUME LOOP: CPT | Mod: PBBFAC,PN

## 2019-05-02 PROCEDURE — 71046 X-RAY EXAM CHEST 2 VIEWS: CPT | Mod: TC

## 2019-05-02 PROCEDURE — 99999 PR PBB SHADOW E&M-EST. PATIENT-LVL V: ICD-10-PCS | Mod: PBBFAC,,, | Performed by: NURSE PRACTITIONER

## 2019-05-02 PROCEDURE — 99214 OFFICE O/P EST MOD 30 MIN: CPT | Mod: 25,S$PBB,, | Performed by: NURSE PRACTITIONER

## 2019-05-02 PROCEDURE — 99999 PR PBB SHADOW E&M-EST. PATIENT-LVL V: CPT | Mod: PBBFAC,,, | Performed by: NURSE PRACTITIONER

## 2019-05-02 PROCEDURE — 99215 OFFICE O/P EST HI 40 MIN: CPT | Mod: PBBFAC,25,PN | Performed by: NURSE PRACTITIONER

## 2019-05-02 PROCEDURE — 99214 PR OFFICE/OUTPT VISIT, EST, LEVL IV, 30-39 MIN: ICD-10-PCS | Mod: 25,S$PBB,, | Performed by: NURSE PRACTITIONER

## 2019-05-02 PROCEDURE — 94375 RESPIRATORY FLOW VOLUME LOOP: CPT | Mod: 26,S$PBB,, | Performed by: INTERNAL MEDICINE

## 2019-05-02 PROCEDURE — 71046 X-RAY EXAM CHEST 2 VIEWS: CPT | Mod: 26,,, | Performed by: RADIOLOGY

## 2019-05-02 RX ORDER — CLONAZEPAM 1 MG/1
TABLET ORAL
COMMUNITY
Start: 2019-04-30

## 2019-05-02 RX ORDER — PNV NO.95/FERROUS FUM/FOLIC AC 28MG-0.8MG
100 TABLET ORAL
COMMUNITY

## 2019-05-02 RX ORDER — OMEPRAZOLE 40 MG/1
CAPSULE, DELAYED RELEASE ORAL
COMMUNITY
Start: 2019-05-01

## 2019-05-02 NOTE — ASSESSMENT & PLAN NOTE
Not motivated to quit   Pt complains of right lower abdominal pain since 0530 this AM. Tried OTC ibuprofen with little relief. Nausea but denies vomiting. Denies GI and  symptoms. Pt speaks some english and has son here to assist with translation.

## 2019-05-02 NOTE — PROGRESS NOTES
"Subjective:      Patient ID: Marce Gill is a 83 y.o. female.    Chief Complaint: COPD    HPI  Patient was COPD and tobacco abuse presents to the office today for follow-up.  Patient back to smoking a pack of cigarettes a day.   Not interested in smoking cessation at this time.  1/2-1 pk/day. Declined inhalers in the past. No fever, chills, or hemoptysis. No pleuritic type chest pain. Breathing is stable as compared to 6 months ago.    Patient Active Problem List   Diagnosis    Diabetes    Cataract extraction status    Smoker    Routine gynecological examination    Hot flashes, menopausal    Abnormal ECG    Tobacco abuse    Mixed hyperlipidemia    COPD (chronic obstructive pulmonary disease)    Shortness of breath    History of CVA (cerebrovascular accident)    Carotid stenosis    GERD (gastroesophageal reflux disease)    Diastolic dysfunction    Essential hypertension    PAD (peripheral artery disease)    Asymptomatic stenosis of both carotid arteries without infarction             /74   Pulse 70   Resp 16   Ht 5' 5" (1.651 m)   Wt 65.9 kg (145 lb 4.5 oz)   SpO2 97%   BMI 24.18 kg/m²   Body mass index is 24.18 kg/m².    Review of Systems   Constitutional: Negative.    HENT: Negative.    Respiratory: Negative.    Cardiovascular: Negative.    Musculoskeletal: Negative.    Gastrointestinal: Negative.    Neurological: Negative.    Psychiatric/Behavioral: Negative.      Objective:      Physical Exam   Constitutional: She is oriented to person, place, and time. She appears well-developed and well-nourished.   HENT:   Head: Normocephalic and atraumatic.   Neck: Normal range of motion. Neck supple.   Cardiovascular: Normal rate and regular rhythm.   Pulmonary/Chest: Effort normal and breath sounds normal.   Abdominal: Soft.   Musculoskeletal: Normal range of motion. She exhibits no edema.   Neurological: She is alert and oriented to person, place, and time.   Skin: Skin is warm and dry. "   Psychiatric: She has a normal mood and affect.     Personal Diagnostic Review  Spirometry reviewed. FEV1 68% of predicted. Stable      CXR pending        Assessment:       1. Chronic obstructive pulmonary disease, unspecified COPD type    2. Smoker        Outpatient Encounter Medications as of 5/2/2019   Medication Sig Dispense Refill    albuterol (VENTOLIN HFA) 90 mcg/actuation inhaler Inhale 2 puffs into the lungs every 4 (four) hours as needed for Wheezing. 18 g 3    amlodipine (NORVASC) 10 MG tablet Take 10 mg by mouth.      ascorbic acid, vitamin C, (VITAMIN C) 1000 MG tablet Take 1,000 mg by mouth once daily.      aspirin (ECOTRIN) 81 MG EC tablet Take 81 mg by mouth.      calcium carb-vit D2-soybean 600-200-25 mg-unit-mg Tab 1 tablet with food      calcium carbonate-vitamin D3 500 mg(1,250mg) -125 unit per tablet Take by mouth.      clonazePAM (KLONOPIN) 1 MG tablet       clopidogrel (PLAVIX) 75 mg tablet Take 75 mg by mouth.      cyanocobalamin (VITAMIN B-12) 100 MCG tablet Take 100 mcg by mouth.      escitalopram oxalate (LEXAPRO) 20 MG tablet Take 20 mg by mouth.      esomeprazole (NEXIUM) 40 MG capsule Take 40 mg by mouth.      fluticasone (FLONASE) 50 mcg/actuation nasal spray 1 spray by Each Nare route as needed. 16 g 11    levothyroxine (SYNTHROID) 88 MCG tablet Take 88 mcg by mouth before breakfast.       lisinopril (PRINIVIL,ZESTRIL) 20 MG tablet Take 20 mg by mouth.      meclizine (ANTIVERT) 25 mg tablet Take 0.5 tablets (12.5 mg total) by mouth 3 (three) times daily as needed. 1/2-1 Tablet Oral Every 8 hours.  spinning 12 tablet 0    meloxicam (MOBIC) 15 MG tablet Take 1 tablet by mouth once daily.      memantine (NAMENDA) 10 MG Tab Take 10 mg by mouth.      metformin (GLUCOPHAGE) 500 MG tablet Take 500 mg by mouth daily with breakfast. 1 Tablet Oral daily      metoprolol succinate (TOPROL-XL) 50 MG 24 hr tablet Take 50 mg by mouth.      montelukast (SINGULAIR) 10 mg tablet  Take 10 mg by mouth.      omeprazole (PRILOSEC) 40 MG capsule       pantoprazole (PROTONIX) 40 MG tablet Take 40 mg by mouth once daily.       temazepam (RESTORIL) 30 mg capsule 1 capsule at bedtime as needed      TRUETRACK TEST Strp       rosuvastatin (CRESTOR) 20 MG tablet Take 1 tablet (20 mg total) by mouth every evening. 90 tablet 3    venlafaxine (EFFEXOR-XR) 150 MG Cp24        No facility-administered encounter medications on file as of 5/2/2019.      Orders Placed This Encounter   Procedures    X-Ray Chest PA And Lateral     Standing Status:   Future     Standing Expiration Date:   5/2/2020     Order Specific Question:   May the Radiologist modify the order per protocol to meet the clinical needs of the patient?     Answer:   Yes    Spirometry without Bronchodilator     Standing Status:   Future     Standing Expiration Date:   5/2/2020     Plan:        Problem List Items Addressed This Visit        Pulmonary    COPD (chronic obstructive pulmonary disease)    Overview     Current smoker. Not motivated to quit         Relevant Orders    X-Ray Chest PA And Lateral    Spirometry without Bronchodilator       Other    Smoker    Current Assessment & Plan     Not motivated to quit

## 2019-05-10 ENCOUNTER — OFFICE VISIT (OUTPATIENT)
Dept: OPHTHALMOLOGY | Facility: CLINIC | Age: 84
End: 2019-05-10
Payer: MEDICARE

## 2019-05-10 DIAGNOSIS — H26.491 PCO (POSTERIOR CAPSULAR OPACIFICATION), RIGHT: Primary | ICD-10-CM

## 2019-05-10 DIAGNOSIS — Z96.1 PSEUDOPHAKIA OF BOTH EYES: ICD-10-CM

## 2019-05-10 DIAGNOSIS — E11.9 TYPE 2 DIABETES MELLITUS WITHOUT COMPLICATION, WITHOUT LONG-TERM CURRENT USE OF INSULIN: ICD-10-CM

## 2019-05-10 DIAGNOSIS — H52.7 REFRACTION DISORDER: ICD-10-CM

## 2019-05-10 PROCEDURE — 99212 OFFICE O/P EST SF 10 MIN: CPT | Mod: PBBFAC,25 | Performed by: OPHTHALMOLOGY

## 2019-05-10 PROCEDURE — 92014 COMPRE OPH EXAM EST PT 1/>: CPT | Mod: 25,S$PBB,, | Performed by: OPHTHALMOLOGY

## 2019-05-10 PROCEDURE — 99999 PR PBB SHADOW E&M-EST. PATIENT-LVL II: ICD-10-PCS | Mod: PBBFAC,,, | Performed by: OPHTHALMOLOGY

## 2019-05-10 PROCEDURE — 92014 PR EYE EXAM, EST PATIENT,COMPREHESV: ICD-10-PCS | Mod: 25,S$PBB,, | Performed by: OPHTHALMOLOGY

## 2019-05-10 PROCEDURE — 66821 AFTER CATARACT LASER SURGERY: CPT | Mod: PBBFAC,RT | Performed by: OPHTHALMOLOGY

## 2019-05-10 PROCEDURE — 99999 PR PBB SHADOW E&M-EST. PATIENT-LVL II: CPT | Mod: PBBFAC,,, | Performed by: OPHTHALMOLOGY

## 2019-05-10 PROCEDURE — 66821 YAG CAPSULOTOMY - OD - RIGHT EYE: ICD-10-PCS | Mod: S$PBB,RT,, | Performed by: OPHTHALMOLOGY

## 2019-05-10 RX ORDER — PREDNISOLONE SODIUM PHOSPHATE 10 MG/ML
1 SOLUTION/ DROPS OPHTHALMIC DAILY
Qty: 10 ML | Refills: 0 | Status: SHIPPED | OUTPATIENT
Start: 2019-05-10 | End: 2019-06-09

## 2019-05-10 NOTE — PROGRESS NOTES
HPI     Diabetic Eye Exam      Additional comments: 1 year DM exam              Comments     Pt states recently she's noticed the right eye vision has become blurry.   She watches the hummingbirds in her yard and having trouble distinguishing   the male from the female. She's not using any drops. Only use readers for   small print. No ocular pain or irritation. Complains of glare OD    1. PCIOL OD 3/1/07  PCIOL OS 3/14/07  2. DM w/o BDR  3. YAG OS 02/12/16          Last edited by Jose Manuel To MD on 5/10/2019  3:03 PM. (History)            Assessment /Plan     For exam results, see Encounter Report.      ICD-10-CM ICD-9-CM    1. PCO (posterior capsular opacification), right H26.491 366.50 Clinically significant with increased difficulty with reading and glare.  Risk, benefits, and alternatives of Yag capsulotomy discussed.    Yag Laser treatment to the right eye.              2. Type 2 diabetes mellitus without complication, without long-term current use of insulin E11.9 250.00 Diabetes with no diabetic retinopathy on dilated exam.   Reviewed diabetic eye precautions including excellent blood sugar control, and importance of regular follow up.          3. Pseudophakia of both eyes Z96.1 V43.1    4. Refraction disorder H52.7 367.9      Yag Operative Procedure Note    83 y.o. year old patient experiencing a symptomatic decrease in vision OD with inability to perform activities of daily living including reading.      SLE: Posterior intraocular lens implant with capsular fibrosis     Risks, benefits and alternatives of Yag Laser Capsulotomy discussed. Including risks of retinal detachment (1-3%), macular edema, dislocated implant, pain, inflammation elevated intraocular pressure and vision loss. Consent signed.  Time out procedure form completed prior to laser.    Medications given:  Alphagan 0.15%  Tetracaine    Laser energy settings:    2.2 energy per shot  32 bursts  1 to 1 ratio per shot     Central capsular  opening created without difficulty      Pred P qid OD  Return to clinic 3 weeks

## 2019-06-19 ENCOUNTER — OFFICE VISIT (OUTPATIENT)
Dept: OPHTHALMOLOGY | Facility: CLINIC | Age: 84
End: 2019-06-19
Payer: MEDICARE

## 2019-06-19 DIAGNOSIS — H04.123 DRY EYE SYNDROME, BILATERAL: ICD-10-CM

## 2019-06-19 DIAGNOSIS — Z96.1 PSEUDOPHAKIA OF BOTH EYES: Primary | ICD-10-CM

## 2019-06-19 PROCEDURE — 99024 PR POST-OP FOLLOW-UP VISIT: ICD-10-PCS | Mod: POP,,, | Performed by: OPHTHALMOLOGY

## 2019-06-19 PROCEDURE — 99999 PR PBB SHADOW E&M-EST. PATIENT-LVL II: ICD-10-PCS | Mod: PBBFAC,,, | Performed by: OPHTHALMOLOGY

## 2019-06-19 PROCEDURE — 99212 OFFICE O/P EST SF 10 MIN: CPT | Mod: PBBFAC | Performed by: OPHTHALMOLOGY

## 2019-06-19 PROCEDURE — 99999 PR PBB SHADOW E&M-EST. PATIENT-LVL II: CPT | Mod: PBBFAC,,, | Performed by: OPHTHALMOLOGY

## 2019-06-19 PROCEDURE — 99024 POSTOP FOLLOW-UP VISIT: CPT | Mod: POP,,, | Performed by: OPHTHALMOLOGY

## 2019-06-19 NOTE — PROGRESS NOTES
HPI     The patient is returning 3 weeks after her Yag OD. The patient states   both her eyes hurt and feel very dry. The patient states her eyes have   been very watery. The patient states she used some artifical tears but it   did not help.    1. PCIOL OD 3/1/07  PCIOL OS 3/14/07  YAG OD 5/10/19  YAG OS 2/12/16  2. +DM w/o BDR    Last edited by Laney Ignram on 6/19/2019  2:57 PM. (History)            Assessment /Plan     For exam results, see Encounter Report.      ICD-10-CM ICD-9-CM    1. Pseudophakia of both eyes Z96.1 V43.1 S/P Yag OD doing well   2. Dry eye syndrome, bilateral H04.123 375.15 Recommend Systane Ultra QID ou if worsen pt may use gel at night        RETURN TO CLINIC 6 months

## 2019-09-05 DIAGNOSIS — I10 ESSENTIAL HYPERTENSION: Primary | ICD-10-CM

## 2019-09-23 ENCOUNTER — CLINICAL SUPPORT (OUTPATIENT)
Dept: CARDIOLOGY | Facility: CLINIC | Age: 84
End: 2019-09-23
Attending: INTERNAL MEDICINE
Payer: MEDICARE

## 2019-09-23 DIAGNOSIS — R06.02 SHORTNESS OF BREATH: Chronic | ICD-10-CM

## 2019-09-23 DIAGNOSIS — I10 ESSENTIAL HYPERTENSION: ICD-10-CM

## 2019-09-23 DIAGNOSIS — I73.9 PAD (PERIPHERAL ARTERY DISEASE): ICD-10-CM

## 2019-09-23 DIAGNOSIS — F17.200 SMOKER: ICD-10-CM

## 2019-09-23 DIAGNOSIS — Z72.0 TOBACCO ABUSE: ICD-10-CM

## 2019-09-23 DIAGNOSIS — I51.89 DIASTOLIC DYSFUNCTION: ICD-10-CM

## 2019-09-23 DIAGNOSIS — I65.23 ASYMPTOMATIC STENOSIS OF BOTH CAROTID ARTERIES WITHOUT INFARCTION: ICD-10-CM

## 2019-09-23 DIAGNOSIS — E78.2 MIXED HYPERLIPIDEMIA: Chronic | ICD-10-CM

## 2019-09-23 DIAGNOSIS — R94.31 ABNORMAL ECG: ICD-10-CM

## 2019-09-23 DIAGNOSIS — J44.9 CHRONIC OBSTRUCTIVE PULMONARY DISEASE, UNSPECIFIED COPD TYPE: ICD-10-CM

## 2019-09-23 DIAGNOSIS — E10.9 TYPE 1 DIABETES MELLITUS WITHOUT COMPLICATION: ICD-10-CM

## 2019-09-23 DIAGNOSIS — Z86.73 HISTORY OF CVA (CEREBROVASCULAR ACCIDENT): ICD-10-CM

## 2019-09-23 LAB — INTERNAL CAROTID STENOSIS: NORMAL

## 2019-09-23 PROCEDURE — 93880 EXTRACRANIAL BILAT STUDY: CPT | Mod: PBBFAC,PO | Performed by: INTERNAL MEDICINE

## 2019-09-23 PROCEDURE — 93880 CAR US DOPPLER CAROTID BILATERAL: ICD-10-PCS | Mod: 26,S$PBB,, | Performed by: INTERNAL MEDICINE

## 2019-09-23 PROCEDURE — 93306 TTE W/DOPPLER COMPLETE: CPT | Mod: PBBFAC,PO | Performed by: INTERNAL MEDICINE

## 2019-09-23 PROCEDURE — 93306 2D ECHO WITH COLOR FLOW DOPPLER: ICD-10-PCS | Mod: 26,S$PBB,, | Performed by: INTERNAL MEDICINE

## 2019-09-24 LAB
AORTIC VALVE REGURGITATION: NORMAL
ESTIMATED PA SYSTOLIC PRESSURE: 27.4
MITRAL VALVE REGURGITATION: NORMAL
RETIRED EF AND QEF - SEE NOTES: 55 (ref 55–65)
TRICUSPID VALVE REGURGITATION: NORMAL

## 2019-10-04 ENCOUNTER — CLINICAL SUPPORT (OUTPATIENT)
Dept: CARDIOLOGY | Facility: CLINIC | Age: 84
End: 2019-10-04
Payer: MEDICARE

## 2019-10-04 ENCOUNTER — OFFICE VISIT (OUTPATIENT)
Dept: CARDIOLOGY | Facility: CLINIC | Age: 84
End: 2019-10-04
Payer: MEDICARE

## 2019-10-04 VITALS
BODY MASS INDEX: 25.2 KG/M2 | SYSTOLIC BLOOD PRESSURE: 126 MMHG | WEIGHT: 151.44 LBS | OXYGEN SATURATION: 97 % | HEART RATE: 66 BPM | DIASTOLIC BLOOD PRESSURE: 60 MMHG

## 2019-10-04 DIAGNOSIS — E78.2 MIXED HYPERLIPIDEMIA: Chronic | ICD-10-CM

## 2019-10-04 DIAGNOSIS — I34.0 NON-RHEUMATIC MITRAL REGURGITATION: ICD-10-CM

## 2019-10-04 DIAGNOSIS — R94.31 ABNORMAL ECG: Primary | ICD-10-CM

## 2019-10-04 DIAGNOSIS — I10 ESSENTIAL HYPERTENSION: ICD-10-CM

## 2019-10-04 DIAGNOSIS — F17.200 SMOKER: ICD-10-CM

## 2019-10-04 DIAGNOSIS — Z72.0 TOBACCO ABUSE: ICD-10-CM

## 2019-10-04 DIAGNOSIS — I65.23 BILATERAL CAROTID ARTERY STENOSIS: ICD-10-CM

## 2019-10-04 DIAGNOSIS — Z86.73 HISTORY OF CVA (CEREBROVASCULAR ACCIDENT): ICD-10-CM

## 2019-10-04 DIAGNOSIS — I65.23 ASYMPTOMATIC STENOSIS OF BOTH CAROTID ARTERIES WITHOUT INFARCTION: ICD-10-CM

## 2019-10-04 DIAGNOSIS — I35.1 NONRHEUMATIC AORTIC VALVE INSUFFICIENCY: ICD-10-CM

## 2019-10-04 DIAGNOSIS — I73.9 PAD (PERIPHERAL ARTERY DISEASE): ICD-10-CM

## 2019-10-04 DIAGNOSIS — I51.89 DIASTOLIC DYSFUNCTION: ICD-10-CM

## 2019-10-04 DIAGNOSIS — R06.02 SHORTNESS OF BREATH: Chronic | ICD-10-CM

## 2019-10-04 PROCEDURE — 93010 ELECTROCARDIOGRAM REPORT: CPT | Mod: S$PBB,,, | Performed by: NUCLEAR MEDICINE

## 2019-10-04 PROCEDURE — 93005 ELECTROCARDIOGRAM TRACING: CPT | Mod: PBBFAC | Performed by: NUCLEAR MEDICINE

## 2019-10-04 PROCEDURE — 99214 OFFICE O/P EST MOD 30 MIN: CPT | Mod: S$PBB,,, | Performed by: INTERNAL MEDICINE

## 2019-10-04 PROCEDURE — 93010 EKG 12-LEAD: ICD-10-PCS | Mod: S$PBB,,, | Performed by: NUCLEAR MEDICINE

## 2019-10-04 PROCEDURE — 99213 OFFICE O/P EST LOW 20 MIN: CPT | Mod: PBBFAC | Performed by: INTERNAL MEDICINE

## 2019-10-04 PROCEDURE — 99999 PR PBB SHADOW E&M-EST. PATIENT-LVL III: CPT | Mod: PBBFAC,,, | Performed by: INTERNAL MEDICINE

## 2019-10-04 PROCEDURE — 99214 PR OFFICE/OUTPT VISIT, EST, LEVL IV, 30-39 MIN: ICD-10-PCS | Mod: S$PBB,,, | Performed by: INTERNAL MEDICINE

## 2019-10-04 PROCEDURE — 99999 PR PBB SHADOW E&M-EST. PATIENT-LVL III: ICD-10-PCS | Mod: PBBFAC,,, | Performed by: INTERNAL MEDICINE

## 2019-10-04 NOTE — PROGRESS NOTES
Subjective:    Patient ID:  Marce Gill is a 84 y.o. female who presents for evaluation of Follow-up; Hyperlipidemia; Hypertension; Carotid Artery Disease; and Risk Factor Management For Atherosclerosis      HPI pt presents for annual f/u.   Her current medical conditions include dyslipidemia, HTN, DM, MR, AI, Diastolic Dysfunction, LVH, carotid disease, COPD, tobacco abuse.   Past hx pertinent for following:  S/p cva May 2014 (put on plavix, [place]).   Now here.  Denies chest pain sxs.  No unusual dyspnea.  Has chronic AYALA, mild.  No pnd/orthopnea.  ecg today shows NSR, possible old septal infarct. No acute changes.  No recurrent TIA/CVA sxs.  On asa, plavix.  No abnl bleeding.  HTN controlled.  DM controlled.  Pt states PCP took her off Metformin, and plans to recheck HGAIC later this year.  Lipids have been controlled, per pt.  PCP ordinarily checks labs.  Still smokes 1 pack every 1.5 days.  No exercise.  Not mindful of diet.  Carotid u/s 9/19 20 - 39% bilateral stenosis.  Echo 9/19 normal LVEF, mild AI, mild MR, mild TR, LVH.      Current Outpatient Medications:     amlodipine (NORVASC) 10 MG tablet, Take 10 mg by mouth., Disp: , Rfl:     ascorbic acid, vitamin C, (VITAMIN C) 1000 MG tablet, Take 1,000 mg by mouth once daily., Disp: , Rfl:     aspirin (ECOTRIN) 81 MG EC tablet, Take 81 mg by mouth., Disp: , Rfl:     calcium carb-vit D2-soybean 600-200-25 mg-unit-mg Tab, 1 tablet with food, Disp: , Rfl:     calcium carbonate-vitamin D3 500 mg(1,250mg) -125 unit per tablet, Take by mouth., Disp: , Rfl:     clonazePAM (KLONOPIN) 1 MG tablet, , Disp: , Rfl:     clopidogrel (PLAVIX) 75 mg tablet, Take 75 mg by mouth., Disp: , Rfl:     escitalopram oxalate (LEXAPRO) 20 MG tablet, Take 20 mg by mouth., Disp: , Rfl:     esomeprazole (NEXIUM) 40 MG capsule, Take 40 mg by mouth., Disp: , Rfl:     levothyroxine (SYNTHROID) 88 MCG tablet, Take 88 mcg by mouth before breakfast. , Disp: , Rfl:     lisinopril  (PRINIVIL,ZESTRIL) 20 MG tablet, Take 20 mg by mouth., Disp: , Rfl:     metoprolol succinate (TOPROL-XL) 50 MG 24 hr tablet, Take 50 mg by mouth., Disp: , Rfl:     montelukast (SINGULAIR) 10 mg tablet, Take 10 mg by mouth., Disp: , Rfl:     pantoprazole (PROTONIX) 40 MG tablet, Take 40 mg by mouth once daily. , Disp: , Rfl:     temazepam (RESTORIL) 30 mg capsule, 1 capsule at bedtime as needed, Disp: , Rfl:     albuterol (VENTOLIN HFA) 90 mcg/actuation inhaler, Inhale 2 puffs into the lungs every 4 (four) hours as needed for Wheezing., Disp: 18 g, Rfl: 3    cyanocobalamin (VITAMIN B-12) 100 MCG tablet, Take 100 mcg by mouth., Disp: , Rfl:     fluticasone (FLONASE) 50 mcg/actuation nasal spray, 1 spray by Each Nare route as needed. (Patient not taking: Reported on 10/4/2019), Disp: 16 g, Rfl: 11    meclizine (ANTIVERT) 25 mg tablet, Take 0.5 tablets (12.5 mg total) by mouth 3 (three) times daily as needed. 1/2-1 Tablet Oral Every 8 hours.  spinning (Patient not taking: Reported on 10/4/2019), Disp: 12 tablet, Rfl: 0    meloxicam (MOBIC) 15 MG tablet, Take 1 tablet by mouth once daily., Disp: , Rfl:     memantine (NAMENDA) 10 MG Tab, Take 10 mg by mouth., Disp: , Rfl:     omeprazole (PRILOSEC) 40 MG capsule, , Disp: , Rfl:     rosuvastatin (CRESTOR) 20 MG tablet, Take 1 tablet (20 mg total) by mouth every evening., Disp: 90 tablet, Rfl: 3    TRUETRACK TEST Strp, , Disp: , Rfl:     venlafaxine (EFFEXOR-XR) 150 MG Cp24, , Disp: , Rfl:       Review of Systems   Constitution: Negative.   HENT: Negative.    Eyes: Negative.    Cardiovascular: Positive for dyspnea on exertion.   Respiratory: Positive for shortness of breath.    Endocrine: Negative.    Hematologic/Lymphatic: Negative.    Skin: Negative.    Musculoskeletal: Positive for arthritis.   Gastrointestinal: Negative.    Genitourinary: Negative.    Neurological: Negative.    Psychiatric/Behavioral: Negative.    Allergic/Immunologic: Negative.        BP  126/60 (BP Location: Left arm, Patient Position: Sitting)   Pulse 66   Wt 68.7 kg (151 lb 7.3 oz)   SpO2 97%   BMI 25.20 kg/m²     Wt Readings from Last 3 Encounters:   10/04/19 68.7 kg (151 lb 7.3 oz)   05/02/19 65.9 kg (145 lb 4.5 oz)   09/26/18 68.1 kg (150 lb 2.1 oz)     Temp Readings from Last 3 Encounters:   08/20/18 98 °F (36.7 °C) (Oral)   03/26/14 98 °F (36.7 °C) (Tympanic)   07/24/13 96.2 °F (35.7 °C) (Tympanic)     BP Readings from Last 3 Encounters:   10/04/19 126/60   05/02/19 112/74   09/26/18 138/68     Pulse Readings from Last 3 Encounters:   10/04/19 66   05/02/19 70   09/26/18 75          Objective:    Physical Exam   Constitutional: She is oriented to person, place, and time. Vital signs are normal. She appears well-developed and well-nourished. She is active and cooperative. She does not have a sickly appearance. She does not appear ill. No distress.   HENT:   Head: Normocephalic.   Neck: Neck supple. Normal carotid pulses, no hepatojugular reflux and no JVD present. Carotid bruit is not present. No thyromegaly present.   Cardiovascular: Normal rate, regular rhythm, S1 normal, S2 normal, normal heart sounds and normal pulses. PMI is not displaced. Exam reveals no gallop and no friction rub.   No murmur heard.  Pulses:       Radial pulses are 2+ on the right side, and 2+ on the left side.   Pulmonary/Chest: Effort normal and breath sounds normal. She has no wheezes. She has no rales.   Abdominal: Soft. Normal appearance, normal aorta and bowel sounds are normal. She exhibits no pulsatile liver, no abdominal bruit, no ascites and no mass. There is no splenomegaly or hepatomegaly. There is no tenderness.   Musculoskeletal: She exhibits no edema.   Lymphadenopathy:     She has no cervical adenopathy.   Neurological: She is alert and oriented to person, place, and time.   Skin: Skin is warm. She is not diaphoretic.   Psychiatric: She has a normal mood and affect. Her behavior is normal.   Nursing  note and vitals reviewed.        I have reviewed all pertinent labs and cardiac studies.      Chemistry        Component Value Date/Time     06/04/2013 1016    K 3.7 06/04/2013 1016     06/04/2013 1016    CO2 26 06/04/2013 1016    BUN 14 06/04/2013 1016    CREATININE 0.9 06/04/2013 1016     (H) 06/04/2013 1016        Component Value Date/Time    CALCIUM 9.9 06/04/2013 1016    ALKPHOS 66 06/04/2013 1016    AST 22 06/04/2013 1016    ALT 12 06/04/2013 1016    BILITOT 0.6 06/04/2013 1016    ESTGFRAFRICA >60 06/04/2013 1016    EGFRNONAA >60 06/04/2013 1016        Lab Results   Component Value Date    WBC 6.20 06/17/2013    HGB 13.0 06/17/2013    HCT 40.0 06/17/2013    MCV 98.3 (H) 06/17/2013     06/17/2013     Lab Results   Component Value Date    HGBA1C 6.3 (H) 06/04/2013     Lab Results   Component Value Date    CHOL 176 06/04/2013    CHOL 167 12/03/2012    CHOL 192 06/18/2012     Lab Results   Component Value Date    HDL 47 06/04/2013    HDL 45 12/03/2012    HDL 52 06/18/2012     Lab Results   Component Value Date    LDLCALC 108.0 06/04/2013    LDLCALC 101.0 12/03/2012    LDLCALC 117.0 06/18/2012     Lab Results   Component Value Date    TRIG 106 06/04/2013    TRIG 105 12/03/2012    TRIG 115 06/18/2012     Lab Results   Component Value Date    CHOLHDL 26.7 06/04/2013    CHOLHDL 26.9 12/03/2012    CHOLHDL 27.1 06/18/2012         RIGHT  The right Proximal Common Carotid Artery is visualized.   The right carotid bulb artery is visualized.   The right Proximal Internal Carotid Artery has 20 - 39% stenosis, associated with heterogeneous plaque.   The right external carotid artery is visualized.   The right vertebral artery is visualized, associated with anterograde flow.   The right ICA/CCA ratio is: 1.28    LEFT  The left Mid Common Carotid Artery is visualized, associated with heterogeneous plaque.   The left carotid bulb artery is visualized.   The left Mid Internal Carotid Artery has 20 - 39%  stenosis, associated with homogeneous plaque.   The left external carotid artery is visualized.   The left vertebral artery is visualized, associated with anterograde flow.   The left ICA/CCA ratio is: 1.35      CONCLUSIONS   There is 20 - 39% right Internal Carotid stenosis.  There is 20 - 39% left Internal Carotid stenosis.          This document has been electronically    SIGNED BY: Abdirahman Hardy MD On: 09/23/2019 17:31    Narrative     Date of Procedure: 09/23/2019        TEST DESCRIPTION   Technical Quality: This is a technically adequate study.     Aorta: The aortic root is normal in size, measuring 2.9 cm at sinotubular junction and 2.9 cm at Sinuses of Valsalva. The proximal ascending aorta is normal in size, measuring 3.0 cm across.     Left Atrium: The left atrial volume index is normal, measuring 27.60 cc/m2.     Left Ventricle: The left ventricle is normal in size, with an end-diastolic diameter of 4.4 cm, and an end-systolic diameter of 3.2 cm. LV wall thickness is normal, with the septum measuring 1.5 cm and the posterior wall measuring 1.3 cm across. Relative   wall thickness was increased at 0.59, and the LV mass index was increased at 165.3 g/m2 consistent with concentric left ventricular hypertrophy. There are no regional wall motion abnormalities. Left ventricular systolic function appears normal. Visually   estimated ejection fraction is 55-60%. The LV Doppler derived stroke volume equals 73.0 ccs.     Diastolic indices: E wave velocity 1.0 m/s, E/A ratio 0.9,  msec., E/e' ratio(avg) 16. Diastolic function is indeterminate.     Right Atrium: The right atrium is normal in size, measuring 4.3 cm in length and 2.9 cm in width in the apical view.     Right Ventricle: The right ventricle is normal in size measuring 2.5 cm at the base in the apical right ventricle-focused view. Global right ventricular systolic function appears normal. Tricuspid annular plane systolic excursion (TAPSE) is 2.1 cm.  The   estimated PA systolic pressure is 27 mmHg.     Aortic Valve:  The aortic valve is normal in structure. Additionally, there is mild aortic regurgitation.     Mitral Valve:  The mitral valve is normal in structure. There is mild mitral regurgitation.     Tricuspid Valve:  The tricuspid valve is normal in structure. There is mild tricuspid regurgitation.     Pulmonary Valve:  The pulmonic valve is not well seen. There is mild pulmonic regurgitation.     IVC: IVC is normal in size and collapses > 50% with a sniff, suggesting normal right atrial pressure of 3 mmHg.     Intracavitary: There is no evidence of pericardial effusion, intracavity mass, thrombi, or vegetation.         CONCLUSIONS     1 - Concentric hypertrophy.     2 - No wall motion abnormalities.     3 - Normal left ventricular systolic function (EF 55-60%).     4 - Indeterminate LV diastolic function.     5 - Normal right ventricular systolic function .     6 - The estimated PA systolic pressure is 27 mmHg.     7 - Mild aortic regurgitation.     8 - Mild mitral regurgitation.     9 - Mild tricuspid regurgitation.             This document has been electronically    SIGNED BY: Yeyo Khan MD On: 09/24/2019 15:52             Assessment:       1. Abnormal ECG    2. Asymptomatic stenosis of both carotid arteries without infarction    3. Bilateral carotid artery stenosis    4. Diastolic dysfunction    5. Essential hypertension    6. History of CVA (cerebrovascular accident)    7. Tobacco abuse    8. Smoker    9. Shortness of breath    10. PAD (peripheral artery disease)    11. Mixed hyperlipidemia    12. Nonrheumatic aortic valve insufficiency    13. Non-rheumatic mitral regurgitation         Plan:             Stable cardiovascular conditions at present time on current medical treatment.  Reviewed all tests and above medical conditions with patient in detail and formulated treatment plan.  Continue optimal medical treatment for cardiovascular  conditions.  Cardiac low salt diet discussed.  Daily exercise encouraged, goal 30 +  minutes aerobic exercise as tolerated.  Maintaining healthy weight and weight loss goals (if needed) were discussed in clinic.  Maintain optimal DM HGAIC control.  Smoking cessation again strongly advised but doubt she will ever quit as she has no motivation.  No changes in meds today.    F/u in 1 year with carotid u/s.

## 2020-06-25 DIAGNOSIS — J44.9 CHRONIC OBSTRUCTIVE PULMONARY DISEASE, UNSPECIFIED COPD TYPE: Primary | ICD-10-CM

## 2020-07-07 DIAGNOSIS — J44.9 CHRONIC OBSTRUCTIVE PULMONARY DISEASE, UNSPECIFIED COPD TYPE: Primary | ICD-10-CM

## 2020-07-09 ENCOUNTER — TELEPHONE (OUTPATIENT)
Dept: PULMONOLOGY | Facility: CLINIC | Age: 85
End: 2020-07-09

## 2020-07-10 ENCOUNTER — TELEPHONE (OUTPATIENT)
Dept: PULMONOLOGY | Facility: CLINIC | Age: 85
End: 2020-07-10

## 2020-07-10 NOTE — TELEPHONE ENCOUNTER
Called pt to schedule pre-procedural Covid-19 testing, 72 hours prior to Spirometry. No answer, mailbox is full.

## 2020-10-05 ENCOUNTER — HOSPITAL ENCOUNTER (OUTPATIENT)
Dept: CARDIOLOGY | Facility: HOSPITAL | Age: 85
Discharge: HOME | End: 2020-10-05
Attending: INTERNAL MEDICINE
Payer: MEDICARE

## 2020-10-05 VITALS
DIASTOLIC BLOOD PRESSURE: 60 MMHG | WEIGHT: 151 LBS | HEIGHT: 65 IN | SYSTOLIC BLOOD PRESSURE: 126 MMHG | BODY MASS INDEX: 25.16 KG/M2

## 2020-10-05 DIAGNOSIS — I65.23 ASYMPTOMATIC STENOSIS OF BOTH CAROTID ARTERIES WITHOUT INFARCTION: ICD-10-CM

## 2020-10-05 LAB
LEFT ARM DIASTOLIC BLOOD PRESSURE: 60 MMHG
LEFT ARM SYSTOLIC BLOOD PRESSURE: 125 MMHG
LEFT CBA DIAS: 19 CM/S
LEFT CBA SYS: 81 CM/S
LEFT CCA DIST DIAS: 16 CM/S
LEFT CCA DIST SYS: 70 CM/S
LEFT CCA MID DIAS: 15 CM/S
LEFT CCA MID SYS: 66 CM/S
LEFT CCA PROX DIAS: 16 CM/S
LEFT CCA PROX SYS: 67 CM/S
LEFT ECA DIAS: 9 CM/S
LEFT ECA SYS: 73 CM/S
LEFT ICA DIST DIAS: 27 CM/S
LEFT ICA DIST SYS: 97 CM/S
LEFT ICA MID DIAS: 25 CM/S
LEFT ICA MID SYS: 95 CM/S
LEFT ICA PROX DIAS: 21 CM/S
LEFT ICA PROX SYS: 60 CM/S
LEFT VERTEBRAL DIAS: 15 CM/S
LEFT VERTEBRAL SYS: 54 CM/S
OHS CV CAROTID RIGHT ICA EDV HIGHEST: 18
OHS CV CAROTID ULTRASOUND LEFT ICA/CCA RATIO: 1.39
OHS CV CAROTID ULTRASOUND RIGHT ICA/CCA RATIO: 1.19
OHS CV PV CAROTID LEFT HIGHEST CCA: 70
OHS CV PV CAROTID LEFT HIGHEST ICA: 97
OHS CV PV CAROTID RIGHT HIGHEST CCA: 68
OHS CV PV CAROTID RIGHT HIGHEST ICA: 75
OHS CV US CAROTID LEFT HIGHEST EDV: 27
RIGHT ARM DIASTOLIC BLOOD PRESSURE: 62 MMHG
RIGHT ARM SYSTOLIC BLOOD PRESSURE: 128 MMHG
RIGHT CBA DIAS: 12 CM/S
RIGHT CBA SYS: 48 CM/S
RIGHT CCA DIST DIAS: 13 CM/S
RIGHT CCA DIST SYS: 63 CM/S
RIGHT CCA MID DIAS: 13 CM/S
RIGHT CCA MID SYS: 68 CM/S
RIGHT CCA PROX DIAS: 12 CM/S
RIGHT CCA PROX SYS: 45 CM/S
RIGHT ECA DIAS: 5 CM/S
RIGHT ECA SYS: 65 CM/S
RIGHT ICA DIST DIAS: 11 CM/S
RIGHT ICA DIST SYS: 42 CM/S
RIGHT ICA MID DIAS: 18 CM/S
RIGHT ICA MID SYS: 75 CM/S
RIGHT ICA PROX DIAS: 18 CM/S
RIGHT ICA PROX SYS: 74 CM/S
RIGHT VERTEBRAL DIAS: 10 CM/S
RIGHT VERTEBRAL SYS: 49 CM/S

## 2020-10-05 PROCEDURE — 93880 EXTRACRANIAL BILAT STUDY: CPT | Mod: 26,,, | Performed by: INTERNAL MEDICINE

## 2020-10-05 PROCEDURE — 93880 EXTRACRANIAL BILAT STUDY: CPT | Mod: PO

## 2020-10-05 PROCEDURE — 93880 CV US DOPPLER CAROTID (CUPID ONLY): ICD-10-PCS | Mod: 26,,, | Performed by: INTERNAL MEDICINE

## 2020-10-06 ENCOUNTER — TELEPHONE (OUTPATIENT)
Dept: CARDIOLOGY | Facility: CLINIC | Age: 85
End: 2020-10-06

## 2020-11-05 DIAGNOSIS — I51.89 DIASTOLIC DYSFUNCTION: ICD-10-CM

## 2020-11-05 DIAGNOSIS — R94.31 ABNORMAL ECG: Primary | ICD-10-CM

## 2020-11-05 DIAGNOSIS — I73.9 PAD (PERIPHERAL ARTERY DISEASE): ICD-10-CM

## 2020-11-05 DIAGNOSIS — I65.23 ASYMPTOMATIC STENOSIS OF BOTH CAROTID ARTERIES WITHOUT INFARCTION: ICD-10-CM

## 2020-11-05 DIAGNOSIS — I10 ESSENTIAL HYPERTENSION: ICD-10-CM

## 2020-11-05 DIAGNOSIS — I65.23 BILATERAL CAROTID ARTERY STENOSIS: ICD-10-CM

## 2024-01-01 NOTE — TELEPHONE ENCOUNTER
Scheduled leg test with pt.  She verbalized understanding of date, time and location without any further questions.   122